# Patient Record
Sex: MALE | Race: WHITE | NOT HISPANIC OR LATINO | Employment: FULL TIME | URBAN - METROPOLITAN AREA
[De-identification: names, ages, dates, MRNs, and addresses within clinical notes are randomized per-mention and may not be internally consistent; named-entity substitution may affect disease eponyms.]

---

## 2020-03-11 ENCOUNTER — TELEPHONE (OUTPATIENT)
Dept: ENDOSCOPY | Facility: HOSPITAL | Age: 50
End: 2020-03-11

## 2020-03-12 ENCOUNTER — OFFICE VISIT (OUTPATIENT)
Dept: GASTROENTEROLOGY | Facility: CLINIC | Age: 50
DRG: 378 | End: 2020-03-12
Payer: COMMERCIAL

## 2020-03-12 VITALS
WEIGHT: 255.75 LBS | DIASTOLIC BLOOD PRESSURE: 86 MMHG | HEIGHT: 66 IN | HEART RATE: 74 BPM | BODY MASS INDEX: 41.1 KG/M2 | SYSTOLIC BLOOD PRESSURE: 124 MMHG

## 2020-03-12 DIAGNOSIS — E78.5 HYPERLIPIDEMIA, UNSPECIFIED HYPERLIPIDEMIA TYPE: ICD-10-CM

## 2020-03-12 DIAGNOSIS — I10 HYPERTENSION, UNSPECIFIED TYPE: ICD-10-CM

## 2020-03-12 DIAGNOSIS — Z12.11 COLON CANCER SCREENING: Primary | ICD-10-CM

## 2020-03-12 PROCEDURE — 99999 PR PBB SHADOW E&M-EST. PATIENT-LVL III: ICD-10-PCS | Mod: PBBFAC,,, | Performed by: PHYSICIAN ASSISTANT

## 2020-03-12 PROCEDURE — 99999 PR PBB SHADOW E&M-EST. PATIENT-LVL III: CPT | Mod: PBBFAC,,, | Performed by: PHYSICIAN ASSISTANT

## 2020-03-12 PROCEDURE — 99499 UNLISTED E&M SERVICE: CPT | Mod: S$PBB,,, | Performed by: PHYSICIAN ASSISTANT

## 2020-03-12 PROCEDURE — 99213 OFFICE O/P EST LOW 20 MIN: CPT | Mod: PBBFAC | Performed by: PHYSICIAN ASSISTANT

## 2020-03-12 PROCEDURE — 99499 NO LOS: ICD-10-PCS | Mod: S$PBB,,, | Performed by: PHYSICIAN ASSISTANT

## 2020-03-12 RX ORDER — PREDNISONE 20 MG/1
TABLET ORAL
COMMUNITY
Start: 2020-01-20 | End: 2020-03-14

## 2020-03-12 RX ORDER — POLYETHYLENE GLYCOL 3350, SODIUM CHLORIDE, SODIUM BICARBONATE, POTASSIUM CHLORIDE 420; 11.2; 5.72; 1.48 G/4L; G/4L; G/4L; G/4L
1 POWDER, FOR SOLUTION ORAL ONCE
Qty: 4000 ML | Refills: 0 | OUTPATIENT
Start: 2020-03-12 | End: 2020-03-15 | Stop reason: HOSPADM

## 2020-03-12 RX ORDER — AMLODIPINE BESYLATE 5 MG/1
TABLET ORAL
COMMUNITY
End: 2020-03-14

## 2020-03-12 NOTE — PROGRESS NOTES
"1. Have you been admitted overnight to the hospital in the past 3 months? no   If yes, schedule an appointment with PCP before scheduling endoscopic procedure.     2. Have you had a stent placed in the last 12 months? no   If yes, for a screening visit, cancel and message the ordering provider.  The patient will need a new order when the time is appropriate.     3. Have you had a stroke or heart attack in the past 6 months? no   If yes, cancel and refer patient to ordering provider for clearance, also message ordering provider to inform.     4. Have you had any chest pain in the past 3 months? no   If yes, Have you been evaluated by your PCP and/or cardiologist and it was determined to not be heart related? not applicable   If No, Pt needs to be seen by PCP or Cardiologist .  Pt can be scheduled once clearance obtained by either of those providers.     5. Do you take prescription weight loss medications?  no   If yes, must stop for 2 weeks prior to procedure.     6. Have you been diagnosed with diverticulitis within the past 3 months? no   If yes, must have been seen by GI within the last 3 months, if not schedule with GI MARTIN.    If pt has been seen by GI, schedule procedure 8-12 weeks post antibiotic treatment.     7. Are you on Dialysis? no  If yes, schedule procedure for the day AFTER dialysis.  Appt time should be 9am or later, patient arrival time is 2 hours prior.  Nulytely or miralax prep for all patients with kidney disease.     8. Are you diabetic?  no   If yes, schedule morning appt. Advise pt to hold all diabetic meds day of procedure.     9. If pt is older than 80 years of age and HAS NOT been seen by GI or PCP within the last 6 months, needs appt with GI MARTIN.   If pt has been seen by the GI provider or PCP within the past 6  months AND meets criteria, schedule procedure AND send message to the endoscopist.     10. Is patient on a "high risk" medication (blood thinner/antiplatelet agent)?  no   If yes, " has cardiac clearance been obtained within the last 60 days? N/A   If no, a new clearance needs to be obtained.       I have reviewed the last colonoscopy for recommendations regarding next procedure bowel prep.  yes  I have reviewed medications and allergies.  yes  I have verified the pharmacy information and appropriate prep sent if needed. yes  Prep instructions have been mailed or sent to portal per patient request. yes    If answers yes to any of the following, schedule at [facility] ONLY. If No, OK for either location.     Is BMI over 45? no  Any complaints of chest pain, new onset or at rest? no  Does pt have an AICD? no  Is there a diagnosis of heart failure? no  Is patient on dialysis? no  Does patient have an insulin pump? no  If procedure for esophageal banding? no

## 2020-03-12 NOTE — PROGRESS NOTES
Clinic Consult:  [facility] Consultation Note    Reason for Consult:  The encounter diagnosis was Colon cancer screening.    PCP: Primary Doctor No   No address on file    CC: Colonoscopy      HPI:  This is a 49 y.o. male here for evaluation of the above. [facility]. He denies any complaints including abdominal pain, changes in bowel habits, nausea, vomiting, hematochezia, melena. Denies any family history of CRC. Referral in media, although no records.      Review of Systems   Constitutional: Negative for appetite change, chills, diaphoresis, fatigue and fever.   HENT: Negative for trouble swallowing.    Eyes: Negative for photophobia and visual disturbance.   Respiratory: Negative for cough, choking, chest tightness and shortness of breath.    Cardiovascular: Negative for chest pain.   Gastrointestinal: Negative for abdominal distention, abdominal pain, anal bleeding, blood in stool, constipation, diarrhea, nausea and vomiting.   Genitourinary: Negative for dysuria and hematuria.   Musculoskeletal: Negative for back pain.   Neurological: Negative for dizziness, weakness, light-headedness and headaches.   Psychiatric/Behavioral: Negative for agitation, confusion and dysphoric mood. The patient is not nervous/anxious.         Medical History:   No past medical history on file.    Surgical History:   No past surgical history on file.    Family History:    No family history on file.    Social History:   Social History     Socioeconomic History    Marital status:      Spouse name: Not on file    Number of children: Not on file    Years of education: Not on file    Highest education level: Not on file   Occupational History    Not on file   Social Needs    Financial resource strain: Not on file    Food insecurity:     Worry: Not on file     Inability: Not on file    Transportation needs:     Medical: Not on file     Non-medical: Not on file   Tobacco  "Use    Smoking status: Never Smoker    Smokeless tobacco: Never Used   Substance and Sexual Activity    Alcohol use: Yes    Drug use: Never    Sexual activity: Not Currently     Partners: Female   Lifestyle    Physical activity:     Days per week: Not on file     Minutes per session: Not on file    Stress: Not on file   Relationships    Social connections:     Talks on phone: Not on file     Gets together: Not on file     Attends Buddhist service: Not on file     Active member of club or organization: Not on file     Attends meetings of clubs or organizations: Not on file     Relationship status: Not on file   Other Topics Concern    Not on file   Social History Narrative    Not on file       Allergies:   Review of patient's allergies indicates:  No Known Allergies    Home Medications:   Current Outpatient Medications on File Prior to Visit   Medication Sig Dispense Refill    amLODIPine (NORVASC) 5 MG tablet       atorvastatin calcium (LIPITOR ORAL)       citalopram hydrobromide (CITALOPRAM ORAL)       predniSONE (DELTASONE) 20 MG tablet TK 2 TS PO D FOR 3 DAYS. BEGIN TOMORROW       No current facility-administered medications on file prior to visit.        Physical Exam:  /86   Pulse 74   Ht 5' 6" (1.676 m)   Wt 116 kg (255 lb 11.7 oz)   BMI 41.28 kg/m²   Body mass index is 41.28 kg/m².  Physical Exam   Constitutional: He is oriented to person, place, and time. He appears well-developed and well-nourished. No distress.   HENT:   Head: Normocephalic and atraumatic.   Eyes: Pupils are equal, round, and reactive to light. EOM are normal.   Neck: Normal range of motion.   Cardiovascular: Normal rate, regular rhythm and normal heart sounds.   No murmur heard.  Pulmonary/Chest: Effort normal and breath sounds normal. No stridor. No respiratory distress. He has no wheezes.   Abdominal: Soft. Bowel sounds are normal. He exhibits no distension and no mass. There is no tenderness. There is no rebound " and no guarding.   Musculoskeletal: Normal range of motion. He exhibits no deformity.   Neurological: He is alert and oriented to person, place, and time. No cranial nerve deficit.   Skin: Skin is warm and dry. No rash noted. He is not diaphoretic. No erythema. No pallor.   Psychiatric: He has a normal mood and affect. His behavior is normal. Judgment and thought content normal.       Labs: Pertinent labs reviewed.  Endoscopy: none  CRC Screening: none  Anticoagulation: none    Assessment:  1. Colon cancer screening         Recommendations:  -Contact [place] for records  -Schedule colonoscopy with Nulytely.    Colon cancer screening  -     Case request GI: COLONOSCOPY  -     peg-electrolyte soln (NULYTELY WITH FLAVOR PACKS) 420 gram SolR; Take 4,000 mLs by mouth once. for 1 dose  Dispense: 4000 mL; Refill: 0        No follow-ups on file.    Thank you so much for allowing me to participate in the care of Jleani Quezada PA-C

## 2020-03-13 ENCOUNTER — HOSPITAL ENCOUNTER (INPATIENT)
Facility: HOSPITAL | Age: 50
LOS: 1 days | Discharge: HOME | DRG: 378 | End: 2020-03-15
Attending: FAMILY MEDICINE | Admitting: INTERNAL MEDICINE
Payer: COMMERCIAL

## 2020-03-13 DIAGNOSIS — R53.1 WEAKNESS: ICD-10-CM

## 2020-03-13 DIAGNOSIS — K92.2 UPPER GI BLEED: Primary | ICD-10-CM

## 2020-03-13 PROCEDURE — 80053 COMPREHEN METABOLIC PANEL: CPT

## 2020-03-13 PROCEDURE — 86920 COMPATIBILITY TEST SPIN: CPT

## 2020-03-13 PROCEDURE — 99291 CRITICAL CARE FIRST HOUR: CPT | Mod: 25

## 2020-03-13 PROCEDURE — 86703 HIV-1/HIV-2 1 RESULT ANTBDY: CPT

## 2020-03-13 PROCEDURE — 85025 COMPLETE CBC W/AUTO DIFF WBC: CPT

## 2020-03-13 PROCEDURE — 86850 RBC ANTIBODY SCREEN: CPT

## 2020-03-13 PROCEDURE — 85610 PROTHROMBIN TIME: CPT

## 2020-03-13 RX ADMIN — SODIUM CHLORIDE 1000 ML: 0.9 INJECTION, SOLUTION INTRAVENOUS at 11:03

## 2020-03-14 ENCOUNTER — ANESTHESIA (OUTPATIENT)
Dept: ENDOSCOPY | Facility: HOSPITAL | Age: 50
DRG: 378 | End: 2020-03-14
Payer: OTHER GOVERNMENT

## 2020-03-14 ENCOUNTER — ANESTHESIA EVENT (OUTPATIENT)
Dept: ENDOSCOPY | Facility: HOSPITAL | Age: 50
DRG: 378 | End: 2020-03-14
Payer: OTHER GOVERNMENT

## 2020-03-14 PROBLEM — K92.2 UPPER GI BLEED: Status: ACTIVE | Noted: 2020-03-14

## 2020-03-14 PROBLEM — D64.9 SYMPTOMATIC ANEMIA: Status: ACTIVE | Noted: 2020-03-14

## 2020-03-14 LAB
ABO + RH BLD: NORMAL
ALBUMIN SERPL BCP-MCNC: 3.2 G/DL (ref 3.5–5.2)
ALP SERPL-CCNC: 45 U/L (ref 55–135)
ALT SERPL W/O P-5'-P-CCNC: 15 U/L (ref 10–44)
ANION GAP SERPL CALC-SCNC: 10 MMOL/L (ref 8–16)
AST SERPL-CCNC: 8 U/L (ref 10–40)
BASOPHILS # BLD AUTO: 0.01 K/UL (ref 0–0.2)
BASOPHILS # BLD AUTO: 0.01 K/UL (ref 0–0.2)
BASOPHILS # BLD AUTO: 0.02 K/UL (ref 0–0.2)
BASOPHILS # BLD AUTO: 0.05 K/UL (ref 0–0.2)
BASOPHILS NFR BLD: 0.1 % (ref 0–1.9)
BASOPHILS NFR BLD: 0.1 % (ref 0–1.9)
BASOPHILS NFR BLD: 0.2 % (ref 0–1.9)
BASOPHILS NFR BLD: 0.3 % (ref 0–1.9)
BILIRUB SERPL-MCNC: 0.2 MG/DL (ref 0.1–1)
BILIRUB UR QL STRIP: NEGATIVE
BLD GP AB SCN CELLS X3 SERPL QL: NORMAL
BLD PROD TYP BPU: NORMAL
BLD PROD TYP BPU: NORMAL
BLOOD UNIT EXPIRATION DATE: NORMAL
BLOOD UNIT EXPIRATION DATE: NORMAL
BLOOD UNIT TYPE CODE: 6200
BLOOD UNIT TYPE CODE: 6200
BLOOD UNIT TYPE: NORMAL
BLOOD UNIT TYPE: NORMAL
BUN SERPL-MCNC: 49 MG/DL (ref 6–20)
CALCIUM SERPL-MCNC: 8.7 MG/DL (ref 8.7–10.5)
CHLORIDE SERPL-SCNC: 108 MMOL/L (ref 95–110)
CLARITY UR: CLEAR
CO2 SERPL-SCNC: 23 MMOL/L (ref 23–29)
CODING SYSTEM: NORMAL
CODING SYSTEM: NORMAL
COLOR UR: YELLOW
CREAT SERPL-MCNC: 1 MG/DL (ref 0.5–1.4)
DIFFERENTIAL METHOD: ABNORMAL
DISPENSE STATUS: NORMAL
DISPENSE STATUS: NORMAL
EOSINOPHIL # BLD AUTO: 0 K/UL (ref 0–0.5)
EOSINOPHIL NFR BLD: 0 % (ref 0–8)
EOSINOPHIL NFR BLD: 0 % (ref 0–8)
EOSINOPHIL NFR BLD: 0.2 % (ref 0–8)
EOSINOPHIL NFR BLD: 0.4 % (ref 0–8)
ERYTHROCYTE [DISTWIDTH] IN BLOOD BY AUTOMATED COUNT: 14 % (ref 11.5–14.5)
ERYTHROCYTE [DISTWIDTH] IN BLOOD BY AUTOMATED COUNT: 15.1 % (ref 11.5–14.5)
ERYTHROCYTE [DISTWIDTH] IN BLOOD BY AUTOMATED COUNT: 15.2 % (ref 11.5–14.5)
ERYTHROCYTE [DISTWIDTH] IN BLOOD BY AUTOMATED COUNT: 15.4 % (ref 11.5–14.5)
EST. GFR  (AFRICAN AMERICAN): >60 ML/MIN/1.73 M^2
EST. GFR  (NON AFRICAN AMERICAN): >60 ML/MIN/1.73 M^2
GLUCOSE SERPL-MCNC: 195 MG/DL (ref 70–110)
GLUCOSE UR QL STRIP: NEGATIVE
HCT VFR BLD AUTO: 24.8 % (ref 40–54)
HCT VFR BLD AUTO: 24.8 % (ref 40–54)
HCT VFR BLD AUTO: 25.4 % (ref 40–54)
HCT VFR BLD AUTO: 26.7 % (ref 40–54)
HGB BLD-MCNC: 7.8 G/DL (ref 14–18)
HGB BLD-MCNC: 8.1 G/DL (ref 14–18)
HGB BLD-MCNC: 8.4 G/DL (ref 14–18)
HGB BLD-MCNC: 8.6 G/DL (ref 14–18)
HGB UR QL STRIP: NEGATIVE
HIV 1+2 AB+HIV1 P24 AG SERPL QL IA: NEGATIVE
IMM GRANULOCYTES # BLD AUTO: 0.05 K/UL (ref 0–0.04)
IMM GRANULOCYTES # BLD AUTO: 0.08 K/UL (ref 0–0.04)
IMM GRANULOCYTES # BLD AUTO: 0.08 K/UL (ref 0–0.04)
IMM GRANULOCYTES # BLD AUTO: 0.2 K/UL (ref 0–0.04)
IMM GRANULOCYTES NFR BLD AUTO: 0.6 % (ref 0–0.5)
IMM GRANULOCYTES NFR BLD AUTO: 0.7 % (ref 0–0.5)
IMM GRANULOCYTES NFR BLD AUTO: 0.8 % (ref 0–0.5)
IMM GRANULOCYTES NFR BLD AUTO: 1.2 % (ref 0–0.5)
INR PPP: 1 (ref 0.8–1.2)
KETONES UR QL STRIP: NEGATIVE
LEUKOCYTE ESTERASE UR QL STRIP: NEGATIVE
LYMPHOCYTES # BLD AUTO: 0.7 K/UL (ref 1–4.8)
LYMPHOCYTES # BLD AUTO: 0.8 K/UL (ref 1–4.8)
LYMPHOCYTES # BLD AUTO: 1 K/UL (ref 1–4.8)
LYMPHOCYTES # BLD AUTO: 4.8 K/UL (ref 1–4.8)
LYMPHOCYTES NFR BLD: 11.4 % (ref 18–48)
LYMPHOCYTES NFR BLD: 29.2 % (ref 18–48)
LYMPHOCYTES NFR BLD: 6.7 % (ref 18–48)
LYMPHOCYTES NFR BLD: 6.9 % (ref 18–48)
MCH RBC QN AUTO: 30.9 PG (ref 27–31)
MCH RBC QN AUTO: 31.3 PG (ref 27–31)
MCH RBC QN AUTO: 31.3 PG (ref 27–31)
MCH RBC QN AUTO: 31.9 PG (ref 27–31)
MCHC RBC AUTO-ENTMCNC: 31.5 G/DL (ref 32–36)
MCHC RBC AUTO-ENTMCNC: 32.2 G/DL (ref 32–36)
MCHC RBC AUTO-ENTMCNC: 32.7 G/DL (ref 32–36)
MCHC RBC AUTO-ENTMCNC: 33.1 G/DL (ref 32–36)
MCV RBC AUTO: 100 FL (ref 82–98)
MCV RBC AUTO: 95 FL (ref 82–98)
MCV RBC AUTO: 96 FL (ref 82–98)
MCV RBC AUTO: 98 FL (ref 82–98)
MONOCYTES # BLD AUTO: 0.3 K/UL (ref 0.3–1)
MONOCYTES # BLD AUTO: 0.4 K/UL (ref 0.3–1)
MONOCYTES # BLD AUTO: 0.6 K/UL (ref 0.3–1)
MONOCYTES # BLD AUTO: 1 K/UL (ref 0.3–1)
MONOCYTES NFR BLD: 2.5 % (ref 4–15)
MONOCYTES NFR BLD: 3.3 % (ref 4–15)
MONOCYTES NFR BLD: 6 % (ref 4–15)
MONOCYTES NFR BLD: 6.9 % (ref 4–15)
NEUTROPHILS # BLD AUTO: 10 K/UL (ref 1.8–7.7)
NEUTROPHILS # BLD AUTO: 10.4 K/UL (ref 1.8–7.7)
NEUTROPHILS # BLD AUTO: 7.3 K/UL (ref 1.8–7.7)
NEUTROPHILS # BLD AUTO: 9.2 K/UL (ref 1.8–7.7)
NEUTROPHILS NFR BLD: 63.1 % (ref 38–73)
NEUTROPHILS NFR BLD: 80.5 % (ref 38–73)
NEUTROPHILS NFR BLD: 89 % (ref 38–73)
NEUTROPHILS NFR BLD: 89.9 % (ref 38–73)
NITRITE UR QL STRIP: NEGATIVE
NRBC BLD-RTO: 0 /100 WBC
NUM UNITS TRANS PACKED RBC: NORMAL
NUM UNITS TRANS PACKED RBC: NORMAL
OB PNL STL: POSITIVE
PH UR STRIP: 6 [PH] (ref 5–8)
PLATELET # BLD AUTO: 141 K/UL (ref 150–350)
PLATELET # BLD AUTO: 149 K/UL (ref 150–350)
PLATELET # BLD AUTO: 150 K/UL (ref 150–350)
PLATELET # BLD AUTO: 227 K/UL (ref 150–350)
PMV BLD AUTO: 10.4 FL (ref 9.2–12.9)
PMV BLD AUTO: 10.9 FL (ref 9.2–12.9)
PMV BLD AUTO: 11.1 FL (ref 9.2–12.9)
PMV BLD AUTO: 11.2 FL (ref 9.2–12.9)
POTASSIUM SERPL-SCNC: 4 MMOL/L (ref 3.5–5.1)
PROT SERPL-MCNC: 5.5 G/DL (ref 6–8.4)
PROT UR QL STRIP: NEGATIVE
PROTHROMBIN TIME: 10.4 SEC (ref 9–12.5)
RBC # BLD AUTO: 2.49 M/UL (ref 4.6–6.2)
RBC # BLD AUTO: 2.54 M/UL (ref 4.6–6.2)
RBC # BLD AUTO: 2.68 M/UL (ref 4.6–6.2)
RBC # BLD AUTO: 2.78 M/UL (ref 4.6–6.2)
SODIUM SERPL-SCNC: 141 MMOL/L (ref 136–145)
SP GR UR STRIP: 1.02 (ref 1–1.03)
URN SPEC COLLECT METH UR: NORMAL
UROBILINOGEN UR STRIP-ACNC: NEGATIVE EU/DL
WBC # BLD AUTO: 10.17 K/UL (ref 3.9–12.7)
WBC # BLD AUTO: 11.2 K/UL (ref 3.9–12.7)
WBC # BLD AUTO: 16.49 K/UL (ref 3.9–12.7)
WBC # BLD AUTO: 9.04 K/UL (ref 3.9–12.7)

## 2020-03-14 PROCEDURE — 93005 ELECTROCARDIOGRAM TRACING: CPT

## 2020-03-14 PROCEDURE — 25000003 PHARM REV CODE 250: Performed by: NURSE ANESTHETIST, CERTIFIED REGISTERED

## 2020-03-14 PROCEDURE — 85025 COMPLETE CBC W/AUTO DIFF WBC: CPT | Mod: 91

## 2020-03-14 PROCEDURE — 21400001 HC TELEMETRY ROOM

## 2020-03-14 PROCEDURE — 99223 1ST HOSP IP/OBS HIGH 75: CPT | Mod: 25,,, | Performed by: INTERNAL MEDICINE

## 2020-03-14 PROCEDURE — 43235 EGD DIAGNOSTIC BRUSH WASH: CPT | Mod: ,,, | Performed by: INTERNAL MEDICINE

## 2020-03-14 PROCEDURE — 63600175 PHARM REV CODE 636 W HCPCS: Performed by: NURSE ANESTHETIST, CERTIFIED REGISTERED

## 2020-03-14 PROCEDURE — 43235 PR EGD, FLEX, DIAGNOSTIC: ICD-10-PCS | Mod: ,,, | Performed by: INTERNAL MEDICINE

## 2020-03-14 PROCEDURE — C9113 INJ PANTOPRAZOLE SODIUM, VIA: HCPCS | Performed by: PHYSICIAN ASSISTANT

## 2020-03-14 PROCEDURE — 63600175 PHARM REV CODE 636 W HCPCS: Performed by: FAMILY MEDICINE

## 2020-03-14 PROCEDURE — C9113 INJ PANTOPRAZOLE SODIUM, VIA: HCPCS | Performed by: INTERNAL MEDICINE

## 2020-03-14 PROCEDURE — 25500020 PHARM REV CODE 255: Performed by: EMERGENCY MEDICINE

## 2020-03-14 PROCEDURE — 43235 EGD DIAGNOSTIC BRUSH WASH: CPT | Performed by: INTERNAL MEDICINE

## 2020-03-14 PROCEDURE — 37000008 HC ANESTHESIA 1ST 15 MINUTES: Performed by: INTERNAL MEDICINE

## 2020-03-14 PROCEDURE — 81003 URINALYSIS AUTO W/O SCOPE: CPT

## 2020-03-14 PROCEDURE — 96374 THER/PROPH/DIAG INJ IV PUSH: CPT

## 2020-03-14 PROCEDURE — 96361 HYDRATE IV INFUSION ADD-ON: CPT

## 2020-03-14 PROCEDURE — C9113 INJ PANTOPRAZOLE SODIUM, VIA: HCPCS | Performed by: FAMILY MEDICINE

## 2020-03-14 PROCEDURE — 36430 TRANSFUSION BLD/BLD COMPNT: CPT

## 2020-03-14 PROCEDURE — 63600175 PHARM REV CODE 636 W HCPCS: Performed by: PHYSICIAN ASSISTANT

## 2020-03-14 PROCEDURE — 25000003 PHARM REV CODE 250: Performed by: INTERNAL MEDICINE

## 2020-03-14 PROCEDURE — 82272 OCCULT BLD FECES 1-3 TESTS: CPT

## 2020-03-14 PROCEDURE — 93010 EKG 12-LEAD: ICD-10-PCS | Mod: ,,, | Performed by: INTERNAL MEDICINE

## 2020-03-14 PROCEDURE — 96376 TX/PRO/DX INJ SAME DRUG ADON: CPT

## 2020-03-14 PROCEDURE — 99223 PR INITIAL HOSPITAL CARE,LEVL III: ICD-10-PCS | Mod: 25,,, | Performed by: INTERNAL MEDICINE

## 2020-03-14 PROCEDURE — 87338 HPYLORI STOOL AG IA: CPT

## 2020-03-14 PROCEDURE — P9016 RBC LEUKOCYTES REDUCED: HCPCS

## 2020-03-14 PROCEDURE — 36415 COLL VENOUS BLD VENIPUNCTURE: CPT

## 2020-03-14 PROCEDURE — 63600175 PHARM REV CODE 636 W HCPCS: Performed by: INTERNAL MEDICINE

## 2020-03-14 PROCEDURE — 93010 ELECTROCARDIOGRAM REPORT: CPT | Mod: ,,, | Performed by: INTERNAL MEDICINE

## 2020-03-14 RX ORDER — ONDANSETRON 8 MG/1
8 TABLET, ORALLY DISINTEGRATING ORAL EVERY 8 HOURS PRN
Status: DISCONTINUED | OUTPATIENT
Start: 2020-03-14 | End: 2020-03-15 | Stop reason: HOSPADM

## 2020-03-14 RX ORDER — HYDROCODONE BITARTRATE AND ACETAMINOPHEN 500; 5 MG/1; MG/1
TABLET ORAL
Status: DISCONTINUED | OUTPATIENT
Start: 2020-03-14 | End: 2020-03-15 | Stop reason: HOSPADM

## 2020-03-14 RX ORDER — SODIUM CHLORIDE 9 MG/ML
INJECTION, SOLUTION INTRAVENOUS CONTINUOUS
Status: DISCONTINUED | OUTPATIENT
Start: 2020-03-14 | End: 2020-03-14

## 2020-03-14 RX ORDER — PANTOPRAZOLE SODIUM 40 MG/10ML
40 INJECTION, POWDER, LYOPHILIZED, FOR SOLUTION INTRAVENOUS
Status: COMPLETED | OUTPATIENT
Start: 2020-03-14 | End: 2020-03-14

## 2020-03-14 RX ORDER — AMLODIPINE BESYLATE 5 MG/1
5 TABLET ORAL DAILY
Status: ON HOLD | COMMUNITY
End: 2020-03-15 | Stop reason: SDUPTHER

## 2020-03-14 RX ORDER — ACETAMINOPHEN 325 MG/1
650 TABLET ORAL EVERY 6 HOURS PRN
Status: DISCONTINUED | OUTPATIENT
Start: 2020-03-14 | End: 2020-03-15 | Stop reason: HOSPADM

## 2020-03-14 RX ORDER — LIDOCAINE HYDROCHLORIDE 10 MG/ML
INJECTION, SOLUTION EPIDURAL; INFILTRATION; INTRACAUDAL; PERINEURAL
Status: DISCONTINUED | OUTPATIENT
Start: 2020-03-14 | End: 2020-03-14

## 2020-03-14 RX ORDER — FLUTICASONE PROPIONATE 50 MCG
1 SPRAY, SUSPENSION (ML) NASAL DAILY
COMMUNITY

## 2020-03-14 RX ORDER — SODIUM CHLORIDE 9 MG/ML
INJECTION, SOLUTION INTRAVENOUS CONTINUOUS
Status: ACTIVE | OUTPATIENT
Start: 2020-03-14 | End: 2020-03-15

## 2020-03-14 RX ORDER — PANTOPRAZOLE SODIUM 40 MG/1
40 TABLET, DELAYED RELEASE ORAL 2 TIMES DAILY
Status: DISCONTINUED | OUTPATIENT
Start: 2020-03-14 | End: 2020-03-15 | Stop reason: HOSPADM

## 2020-03-14 RX ORDER — DIPHENHYDRAMINE HCL 25 MG
25 TABLET ORAL NIGHTLY
Status: ON HOLD | COMMUNITY
End: 2020-03-15 | Stop reason: HOSPADM

## 2020-03-14 RX ORDER — CETIRIZINE HYDROCHLORIDE 10 MG/1
10 TABLET ORAL DAILY
COMMUNITY

## 2020-03-14 RX ORDER — HYDROCODONE BITARTRATE AND ACETAMINOPHEN 5; 325 MG/1; MG/1
1 TABLET ORAL EVERY 6 HOURS PRN
Status: DISCONTINUED | OUTPATIENT
Start: 2020-03-14 | End: 2020-03-15 | Stop reason: HOSPADM

## 2020-03-14 RX ORDER — PROPOFOL 10 MG/ML
VIAL (ML) INTRAVENOUS
Status: DISCONTINUED | OUTPATIENT
Start: 2020-03-14 | End: 2020-03-14

## 2020-03-14 RX ORDER — PANTOPRAZOLE SODIUM 40 MG/10ML
40 INJECTION, POWDER, LYOPHILIZED, FOR SOLUTION INTRAVENOUS 2 TIMES DAILY
Status: DISCONTINUED | OUTPATIENT
Start: 2020-03-14 | End: 2020-03-14

## 2020-03-14 RX ORDER — LABETALOL HYDROCHLORIDE 5 MG/ML
10 INJECTION, SOLUTION INTRAVENOUS EVERY 6 HOURS PRN
Status: DISCONTINUED | OUTPATIENT
Start: 2020-03-14 | End: 2020-03-15 | Stop reason: HOSPADM

## 2020-03-14 RX ORDER — SODIUM CHLORIDE, SODIUM LACTATE, POTASSIUM CHLORIDE, CALCIUM CHLORIDE 600; 310; 30; 20 MG/100ML; MG/100ML; MG/100ML; MG/100ML
INJECTION, SOLUTION INTRAVENOUS CONTINUOUS PRN
Status: DISCONTINUED | OUTPATIENT
Start: 2020-03-14 | End: 2020-03-14

## 2020-03-14 RX ADMIN — IOHEXOL 100 ML: 350 INJECTION, SOLUTION INTRAVENOUS at 02:03

## 2020-03-14 RX ADMIN — PROPOFOL 150 MG: 10 INJECTION, EMULSION INTRAVENOUS at 02:03

## 2020-03-14 RX ADMIN — SODIUM CHLORIDE 1000 ML: 0.9 INJECTION, SOLUTION INTRAVENOUS at 06:03

## 2020-03-14 RX ADMIN — PROPOFOL 70 MG: 10 INJECTION, EMULSION INTRAVENOUS at 02:03

## 2020-03-14 RX ADMIN — PANTOPRAZOLE SODIUM 40 MG: 40 TABLET, DELAYED RELEASE ORAL at 09:03

## 2020-03-14 RX ADMIN — SODIUM CHLORIDE: 0.9 INJECTION, SOLUTION INTRAVENOUS at 03:03

## 2020-03-14 RX ADMIN — PANTOPRAZOLE SODIUM 8 MG/HR: 40 INJECTION, POWDER, FOR SOLUTION INTRAVENOUS at 01:03

## 2020-03-14 RX ADMIN — SODIUM CHLORIDE: 0.9 INJECTION, SOLUTION INTRAVENOUS at 08:03

## 2020-03-14 RX ADMIN — PANTOPRAZOLE SODIUM 40 MG: 40 INJECTION, POWDER, LYOPHILIZED, FOR SOLUTION INTRAVENOUS at 12:03

## 2020-03-14 RX ADMIN — SODIUM CHLORIDE, SODIUM LACTATE, POTASSIUM CHLORIDE, AND CALCIUM CHLORIDE: 600; 310; 30; 20 INJECTION, SOLUTION INTRAVENOUS at 02:03

## 2020-03-14 RX ADMIN — PANTOPRAZOLE SODIUM 40 MG: 40 INJECTION, POWDER, LYOPHILIZED, FOR SOLUTION INTRAVENOUS at 05:03

## 2020-03-14 RX ADMIN — LIDOCAINE HYDROCHLORIDE 10 ML: 10 INJECTION, SOLUTION EPIDURAL; INFILTRATION; INTRACAUDAL; PERINEURAL at 02:03

## 2020-03-14 NOTE — H&P
[facility] Hospital Medicine  History & Physical    Patient Name: Jelani Whaley  MRN: [id number]  Admission Date: 3/13/2020  Attending Physician: Shekhar Davies MD   Primary Care Provider: Primary Doctor No         Patient information was obtained from patient, past medical records and ER records.     Subjective:     Principal Problem:Upper GI bleed    Chief Complaint:   Chief Complaint   Patient presents with    Melena     S/O 24hr ago; Pt. states that stool is tarry/dark black. Estimates 5-6 BMs last night and 1 BM today with blood. Pt. pale.    Weakness    Loss of Consciousness     Pt. reports multiple syncopal episodes between last night and today with standing/walking. Denies hitting head in syncopal episodes.        HPI: Jelani Whaley is a 49 year old male with hypertension and hyperlipidemia who presented to the ED with reports of melena that began one day prior to presentation. The patient reports that he awoke during the night prior to presentation, had multiple black stools, which were initially formed, but became tarry. There was associated dizziness and possible syncope relieved by laying on the ground. The patient also notes associated palpitations and mild nausea two days prior to presentation. He denies abdominal pain, vomiting, bright red blood per rectum and prior history of GI bleeding. He reports taking 2-3 packets of BC powders daily and was recently prescribed steroids for an upper respiratory illness. In the ED, the patient was tachycardic and hypotensive. His hemoglobin was 8.1 with no prior measurement available. Other labs were significant for a BUN of 49 with a creatinine of 1, glucose of 195, albumin of 3.2 and total protein of 5.5. CTA of the abdomen is pending.     Past Medical History:   Diagnosis Date    Hyperlipidemia     Hypertension        History reviewed. No pertinent surgical history.    Review of patient's allergies indicates:  No Known Allergies    No  current facility-administered medications on file prior to encounter.      Current Outpatient Medications on File Prior to Encounter   Medication Sig    amLODIPine (NORVASC) 5 MG tablet Take 5 mg by mouth once daily.    aspirin/salicylamide/caffeine (BC HEADACHE POWDER ORAL) Take 1 packet by mouth 3 (three) times daily as needed (Patient taking 2-3 daily).    atorvastatin calcium (LIPITOR ORAL) Take by mouth.    cetirizine (ZYRTEC) 10 MG tablet Take 10 mg by mouth once daily.    diphenhydrAMINE (SOMINEX) 25 mg tablet Take 25 mg by mouth every evening.    fluticasone propionate (FLONASE) 50 mcg/actuation nasal spray 1 spray by Each Nostril route once daily.    [DISCONTINUED] amLODIPine (NORVASC) 5 MG tablet     [DISCONTINUED] atorvastatin calcium (LIPITOR ORAL)     [DISCONTINUED] citalopram hydrobromide (CITALOPRAM ORAL)     [DISCONTINUED] predniSONE (DELTASONE) 20 MG tablet TK 2 TS PO D FOR 3 DAYS. BEGIN TOMORROW     Family History     Reviewed and not pertinent.         Tobacco Use    Smoking status: Never Smoker    Smokeless tobacco: Never Used   Substance and Sexual Activity    Alcohol use: Yes     Frequency: 2-4 times a month     Drinks per session: 3 or 4    Drug use: Never    Sexual activity: Not Currently     Partners: Female     Review of Systems   Constitutional: Negative for appetite change, chills, diaphoresis, fatigue and fever.   HENT: Negative for congestion, ear pain, mouth sores, sore throat and trouble swallowing.    Eyes: Negative for pain and visual disturbance.   Respiratory: Negative for cough, chest tightness and shortness of breath.    Cardiovascular: Positive for palpitations. Negative for chest pain and leg swelling.   Gastrointestinal: Positive for blood in stool (melena). Negative for abdominal pain, constipation, diarrhea and nausea.   Endocrine: Negative for cold intolerance, heat intolerance, polydipsia and polyuria.   Genitourinary: Negative for dysuria, frequency and  hematuria.   Musculoskeletal: Negative for arthralgias, back pain, myalgias and neck pain.   Skin: Negative for pallor, rash and wound.   Allergic/Immunologic: Negative for environmental allergies and immunocompromised state.   Neurological: Positive for dizziness and syncope. Negative for seizures, weakness, numbness and headaches.   Hematological: Negative for adenopathy. Does not bruise/bleed easily.   Psychiatric/Behavioral: Negative for agitation, confusion and sleep disturbance.     Objective:     Vital Signs (Most Recent):  Temp: 98.4 °F (36.9 °C) (03/14/20 0334)  Pulse: 110 (03/14/20 0450)  Resp: 18 (03/14/20 0450)  BP: 125/64 (03/14/20 0450)  SpO2: 100 % (03/14/20 0450) Vital Signs (24h Range):  Temp:  [97.8 °F (36.6 °C)-98.6 °F (37 °C)] 98.4 °F (36.9 °C)  Pulse:  [] 110  Resp:  [15-20] 18  SpO2:  [97 %-100 %] 100 %  BP: ()/(52-70) 125/64     Weight: 113.5 kg (250 lb 3.6 oz)  Body mass index is 40.39 kg/m².    Physical Exam   Constitutional: He is oriented to person, place, and time. He appears well-developed and well-nourished.   HENT:   Head: Normocephalic and atraumatic.   Eyes: Conjunctivae are normal.   Neck: Neck supple. No JVD present.   Cardiovascular: Normal rate, regular rhythm and normal heart sounds.   Pulmonary/Chest: Effort normal and breath sounds normal. He has no wheezes.   Abdominal: Soft. Bowel sounds are normal. He exhibits no distension. There is no tenderness.   Musculoskeletal: Normal range of motion.   Neurological: He is alert and oriented to person, place, and time.   Skin: Skin is warm and dry. No rash noted.   Psychiatric: He has a normal mood and affect. His behavior is normal. Thought content normal.   Nursing note and vitals reviewed.          Significant Labs:   CBC:   Recent Labs   Lab 03/13/20  2355   WBC 16.49*   HGB 8.1*   HCT 24.8*        CMP:   Recent Labs   Lab 03/13/20  2355      K 4.0      CO2 23   *   BUN 49*   CREATININE 1.0    CALCIUM 8.7   PROT 5.5*   ALBUMIN 3.2*   BILITOT 0.2   ALKPHOS 45*   AST 8*   ALT 15   ANIONGAP 10   EGFRNONAA >60     All pertinent labs within the past 24 hours have been reviewed.    Significant Imaging: I have reviewed all pertinent imaging results/findings within the past 24 hours.     Assessment/Plan:     * Upper GI bleed  -Transfuse 2 units PRBC.   -Monitor hemoglobin and hematocrit.   -IV BID Protonix.   -GI consult.   -Patient counseled on cessation of BC powder use.     Symptomatic anemia  -Transfuse 2 units PRBC.   -Monitor hemoglobin and hematocrit.     Hyperlipidemia  Resume statin when appropriate.       Hypertension  -Hold Norvasc due to volume loss and presumed positive orthostatics due to intolerance.   -Labetalol as needed.       VTE Risk Mitigation (From admission, onward)         Ordered     IP VTE HIGH RISK PATIENT  Once      03/14/20 0440     Place sequential compression device  Until discontinued      03/14/20 0440                   LUIS MANUEL Burnett  Department of Hospital Medicine   [facility]

## 2020-03-14 NOTE — PLAN OF CARE
Pt AAO x4.  Pt remains free of falls throughout shift.  Plan of care reviewed. Pt verbalizes understanding.  Pt moving and turning independently. Frequent weight shifting encouraged.  Sinus tach rhythm on monitor.  Hourly rounding completed.  Pt currently getting 1 unit blood. Tolerating well.  EGD scheduled for 2:00 PM today.  Will continue to monitor.

## 2020-03-14 NOTE — PROGRESS NOTES
[facility]   Gastroenterology  Progress Note    Patient Name: Jelani Whaley  MRN: [id number]  Admission Date: 3/13/2020  Hospital Length of Stay: 0 days  Code Status: Full Code   Attending Provider: Merlene Mclean MD  Consulting Provider: Lenka Prather MD  Primary Care Physician: Primary Doctor No  Principal Problem: Upper GI bleed    No new subjective & objective note has been filed under this hospital service since the last note was generated.  HPI:    Mr. Whaley is this is a 49-year-old male admitted with upper gastrointestinal bleeding presenting with melena.  Has been suffering from sinusitis and upper respiratory tract infection and a couple weeks ago was given steroids.  In addition to that he has been taking BC powder and went to the GI clinic last week for a colon cancer screening schedule an office appointment.  On Thursday has noticed black tarry stools along with his fatigue and tiredness he has felt lightheaded as well and came to the hospital last night and felt was found to be hypotensive.  Lab testing showed a hemoglobin of 8.1 hematocrit of 24.8 a.m. also had a white count of 16.49 platelet count of 202.  BUN was elevated 49 with a normal creatinine.     Other symptoms include abdominal pain in the epigastric area that is burning and nonradiating, moderate in severity.  Also had palpitations, nausea, shortness of breath.  Possible loss of consciousness as he is unsure and on admission he is not aware that he blacked out but was not completely aware of his surroundings.  He has denied any hematemesis, hematochezia, bleeding elsewhere, use of oral anticoagulants, prior upper endoscopies, colonoscopy, family history of gastrointestinal malignancies.  He has received 1 unit of blood and his repeat CBC showed a hemoglobin of 7.8 and hematocrit of 24.8 with a white count of 10 point 17 and platelet count of 141. No history of liver disease. Currently he is being prepared for a  "2nd unit of blood.  Currently in telemetry bed.    Past medical history is significant for hypertension, dyslipidemia and obesity.    ROS:  CONSTITUTIONAL: Denies weight change,  fatigue, fevers, chills, night sweats.  EYES: No changes in vision.   ENT: No oral lesions or sore throat.  HEMATOLOGICAL/Lymph: Denies bleeding tendency, bruising tendency. No swellings or enlarged lymph nodes.  CARDIOVASCULAR: Denies chest pain  RESPIRATORY: Denies cough, hemoptysis, dyspnea, and wheezing.  GI: See HPI.  : Denies dysuria and hematuria  MUSCULOSKELETAL: Denies joint pain or swelling, back pain and muscle pain.  SKIN: Denies rashes.  NEUROLOGIC: Denies headaches, seizures and numbness.  PSYCHIATRIC: Denies depression or anxiety.  ENDOCRINE: Denies heat or cold intolerance and excessive thirst or urination.    Physical Exam:  Vital Signs:  BP (!) (P) 98/54   Pulse (P) 105   Temp (P) 99.1 °F (37.3 °C) (Oral)   Resp (P) 16   Ht 5' 6" (1.676 m)   Wt 113.5 kg (250 lb 3.6 oz)   SpO2 (P) 97%   BMI 40.39 kg/m²   Body mass index is 40.39 kg/m².      GENERAL: No acute distress, A&Ox3  EYES: Anicteric, pale conjunctivae noted.  ENT: OP clear  NECK: Supple, no masses, no thyromegally.  CHEST: Equal breath sounds bilaterally, no wheezing.  CARDIOVASCULAR: Regular rate and rhythm. Murmurs, rubs and gallops absent.  ABDOMEN: soft, non-tender, non-distended, normal bowel sounds, no hepatosplenomegaly   EXTREMITIES: No clubbing, cyanosis or edema.  SKIN: Without lesion or erythema.  LYMPH: No cervical, axillary lymphadenopathy palpable.   NEUROLOGICAL: Grossly normal, no asterixis present.    Labs: Pertinent labs reviewed.      Assessment/Plan:     No new Assessment & Plan notes have been filed under this hospital service since the last note was generated.  Service: Gastroenterology    Mr. Sultana is a 49-year-old male admitted with melena from most likely upper GI source with a recent history of taking NSAIDs and prednisone at " half with him at risk for peptic ulcer disease.  Currently he is still orthostatic on evaluation today with a hemoglobin and hematocrit after transfusion of 1 unit 7.8 and 24.8 with a prior admission blood count showed a hemoglobin of 8.1 hematocrit of 24.8 mm most likely due to hemoconcentration.  He does not recall when his last episode melena was.  Concern for ongoing bleed is present and will plan for upper endoscopy today after receiving his 2nd unit of blood in the meantime he is on a PPI b.i.d. IV and recommend a PPI drip given concern for ongoing bleed.    CT scan was performed and clear it will .    Recommendations  Continue to keep NPO  Switch to PPI drip with a Protonix 80 mg IV bolus followed by 8 milligrams/hour rate  Supportive hemodynamic IV fluids to maintain blood pressure  EGD after the 2nd unit of blood  Please call me for any questions or concerns    Thank you for your consult. I will follow-up with patient. Please contact us if you have any additional questions.    Lenak Prather MD  Gastroenterology  [facility]

## 2020-03-14 NOTE — PLAN OF CARE
POC reviewed with pt, verbalized understanding. Pt remained free from falls, fall precautions in place. Pt is ST on monitor. VSS. No other c/o at this time. PIV intact, infusing blood. Pt on 1 unit out of 2 units. Pt unable to tolerate sitting on edge of bed to take orthostatics, NP aware. Call bell and personal belongings within reach. Hourly rounding complete. Reminded to call for assistance. Will continue to monitor.

## 2020-03-14 NOTE — ED NOTES
Updated son on plan of care. Son states he will go home and get some rest. Number to call [phone] ( Ziyad Diez).

## 2020-03-14 NOTE — H&P
PRE PROCEDURE H&P    Patient Name: Jelani Whaley  MRN: [id number]  : 1970  Date of Procedure:  3/14/2020  Referring Physician: Self, Aaareferral  Primary Physician: Primary Doctor No  Procedure Physician: Lenka Prather MD       Planned Procedure: EGD  Diagnosis: melena  Chief Complaint: Same as above    HPI: Patient is an 49 y.o. male is here for the above.     Past Medical History:   Past Medical History:   Diagnosis Date    Hyperlipidemia     Hypertension         Past Surgical History:  History reviewed. No pertinent surgical history.     Home Medications:  Prior to Admission medications    Medication Sig Start Date End Date Taking? Authorizing Provider   amLODIPine (NORVASC) 5 MG tablet Take 5 mg by mouth once daily.   Yes Historical Provider, MD   aspirin/salicylamide/caffeine (BC HEADACHE POWDER ORAL) Take 1 packet by mouth 3 (three) times daily as needed (Patient taking 2-3 daily).   Yes Historical Provider, MD   atorvastatin calcium (LIPITOR ORAL) Take by mouth.   Yes Historical Provider, MD   cetirizine (ZYRTEC) 10 MG tablet Take 10 mg by mouth once daily.   Yes Historical Provider, MD   diphenhydrAMINE (SOMINEX) 25 mg tablet Take 25 mg by mouth every evening.   Yes Historical Provider, MD   fluticasone propionate (FLONASE) 50 mcg/actuation nasal spray 1 spray by Each Nostril route once daily.   Yes Historical Provider, MD   amLODIPine (NORVASC) 5 MG tablet   3/14/20  Historical Provider, MD   atorvastatin calcium (LIPITOR ORAL)   3/14/20  Historical Provider, MD   citalopram hydrobromide (CITALOPRAM ORAL)   3/14/20  Historical Provider, MD   predniSONE (DELTASONE) 20 MG tablet TK 2 TS PO D FOR 3 DAYS. BEGIN TOMORROW 1/20/20 3/14/20  Historical Provider, MD        Allergies:  Review of patient's allergies indicates:  No Known Allergies     Social History:   Social History     Socioeconomic History    Marital status:      Spouse name: Not on file    Number of children: Not on  "file    Years of education: Not on file    Highest education level: Not on file   Occupational History    Not on file   Social Needs    Financial resource strain: Not on file    Food insecurity:     Worry: Not on file     Inability: Not on file    Transportation needs:     Medical: Not on file     Non-medical: Not on file   Tobacco Use    Smoking status: Never Smoker    Smokeless tobacco: Never Used   Substance and Sexual Activity    Alcohol use: Yes     Frequency: 2-4 times a month     Drinks per session: 3 or 4    Drug use: Never    Sexual activity: Not Currently     Partners: Female   Lifestyle    Physical activity:     Days per week: Not on file     Minutes per session: Not on file    Stress: Not on file   Relationships    Social connections:     Talks on phone: Not on file     Gets together: Not on file     Attends Restorationist service: Not on file     Active member of club or organization: Not on file     Attends meetings of clubs or organizations: Not on file     Relationship status: Not on file   Other Topics Concern    Not on file   Social History Narrative    Not on file       Family History:  History reviewed. No pertinent family history.    ROS: No acute cardiac events, no acute respiratory complaints.     Physical Exam (all patients):    /66   Pulse 99   Temp 99.5 °F (37.5 °C)   Resp 18   Ht 5' 6" (1.676 m)   Wt 117.5 kg (259 lb 0.7 oz)   SpO2 99%   BMI 41.81 kg/m²   Lungs: Clear to auscultation bilaterally, respirations unlabored  Heart: Regular rate and rhythm, S1 and S2 normal, no obvious murmurs  Abdomen:         Soft, non-tender, bowel sounds normal, no masses, no organomegaly    Lab Results   Component Value Date    WBC 10.17 03/14/2020     (H) 03/14/2020    RDW 15.4 (H) 03/14/2020     (L) 03/14/2020    INR 1.0 03/13/2020     (H) 03/13/2020    BUN 49 (H) 03/13/2020     03/13/2020    K 4.0 03/13/2020     03/13/2020        SEDATION PLAN: per " anesthesia      History reviewed, vital signs satisfactory, cardiopulmonary status satisfactory, sedation options, risks and plans have been discussed with the patient  All their questions were answered and the patient agrees to the sedation procedures as planned and the patient is deemed an appropriate candidate for the sedation as planned.    Procedure explained to patient, informed consent obtained and placed in chart.    Lenka Prather  3/14/2020  2:09 PM

## 2020-03-14 NOTE — DISCHARGE INSTRUCTIONS
Esophagitis     With esophagitis, the lining of the esophagus is inflamed.   Do you often have burning pain in your chest? You may have esophagitis. This is when the lining of the esophagus becomes red and swollen (inflamed). The esophagus is the tube that connects your throat to your stomach. This sheet tells you more about esophagitis. It also explains your treatment options.  Main types of esophagitis  Reflux esophagitis. This is the more common type. It is caused by GERD (gastroesophageal reflux disease). Stomach contents with stomach acid flow back up into the esophagus. This happens over and over. It leads to inflammation. Risk factors can include:  · Being overweight  · Asthma  · Smoking  · Pregnancy  · Frequent vomiting  · Certain medicines (such as aspirin and other anti-inflammatories)  · Hiatal hernia  Infectious esophagitis. This is caused by an infection. You are more at risk for this if you have a weakened immune system and poor nutrition. Antibiotic use can also be a factor. The infection is often due to the following:  · A type of fungus (typically candida)  · A virus, such as herpes simplex virus 1 (HSV-1) or cytomegalovirus (CMV)  Eosinophilic esophagitis. Foods or other things around you can give you an allergic reaction. This triggers an immune response and leads to esophagitis.  Pill-induced esophagitis. Certain types of medicines can cause inflammation and ulcers in the esophagus. These include doxycycline, aspirin, NSAIDs, alendronate, potassium, quinidine, iron.  Symptoms of esophagitis  The following symptoms can occur with esophagitis:  · Pain when swallowing, or trouble swallowing  · Pain behind your breastbone (heartburn)  · Acid regurgitation  · Chronic sore throat  · Gum Inflammation  · Cavities  · Bad breath  · Nausea  · Pain in your upper belly (abdomen)  · Bleeding (indicated by bright red vomit or black, tarry stool)  These symptoms occur more often with reflux  esophagitis:  · Coughing, wheezing, or asthma  · Hoarseness  Diagnosis of esophagitis  Your healthcare provider will ask about your health history and symptoms. Youll also be examined. Sometimes certain tests are needed. These may include:  · Upper endoscopy. A thin, flexible tube with a tiny light and camera is used. It is inserted through the mouth down into the esophagus. This lets the provider look for damage. A small sample of tissue (biopsy) may also be removed. The sample is sent to a lab for testing.  · Upper GI X-ray with barium. An X-ray is done after you drink a substance called barium. Barium may make problems in the esophagus easier to see on an x-ray.  · Esophageal pH. A soft, thin tube is passed into the esophagus through the nose or mouth for 24 hours. It measures the acid level in the esophagus.  · Esophageal manometry. A soft, thin tube is passed into the esophagus through the nose or mouth. It measures muscle contractions in the esophagus.  Treatment of esophagitis  Medicines. Different medicines can help treat esophagitis. The medicine used will depend on the type of esophagitis you have. Talk with your healthcare provider.  Lifestyle changes. Making the following changes can help reduce irritation and ease your symptoms:  · Avoid spicy foods (pepper, chili powder, christina). Also avoid hard foods (nuts, crackers, raw vegetables) and acidic foods and drinks (tomatoes, citrus fruits and juices). Other problem foods include chocolate, peppermint, nutmeg, and foods high in fat.  · Until you can swallow without pain, follow a combined liquid and soft diet. Try foods such as cooked cereals, mashed potatoes, and soups.  · Take small bites and chew your food thoroughly.  · Avoid large meals and heavy evening meals. Don't lie down within 2 to 3 hours of eating.  · Get to or stay at a healthy weight.  · Avoid alcohol, caffeine, and smoking or tobacco products.  · Brush and floss your teeth  · Raise your  upper body by 4 to 6 inches when lying in bed. This can be done using a foam wedge. Or put blocks under the legs at the head of your bed.  Surgery. This may be needed for severe reflux esophagitis. Other noninvasive procedures to treat GERD and esophagitis are being studied. Your provider can tell you more.  Why treatment Is important  Without treatment, esophagitis can get worse. This is especially true with severe reflux esophagitis. For instance, continued symptoms can cause scarring of the esophagus. Over time, this can cause a narrowing the esophagus (stricture). This can make it hard to pass food down to the stomach. As symptoms go on they can also cause changes in the lining of the esophagus. These changes can put you at a slightly higher risk of cancer of the esophagus.   Date Last Reviewed: 7/1/2016  © 6879-9385 [employer]. [address] [place] [address], [place] [address]. All rights reserved. This information is not intended as a substitute for professional medical care. Always follow your healthcare professional's instructions.

## 2020-03-14 NOTE — SUBJECTIVE & OBJECTIVE
Past Medical History:   Diagnosis Date    Hyperlipidemia     Hypertension        History reviewed. No pertinent surgical history.    Review of patient's allergies indicates:  No Known Allergies    No current facility-administered medications on file prior to encounter.      Current Outpatient Medications on File Prior to Encounter   Medication Sig    amLODIPine (NORVASC) 5 MG tablet Take 5 mg by mouth once daily.    aspirin/salicylamide/caffeine (BC HEADACHE POWDER ORAL) Take 1 packet by mouth 3 (three) times daily as needed (Patient taking 2-3 daily).    atorvastatin calcium (LIPITOR ORAL) Take by mouth.    cetirizine (ZYRTEC) 10 MG tablet Take 10 mg by mouth once daily.    diphenhydrAMINE (SOMINEX) 25 mg tablet Take 25 mg by mouth every evening.    fluticasone propionate (FLONASE) 50 mcg/actuation nasal spray 1 spray by Each Nostril route once daily.    [DISCONTINUED] amLODIPine (NORVASC) 5 MG tablet     [DISCONTINUED] atorvastatin calcium (LIPITOR ORAL)     [DISCONTINUED] citalopram hydrobromide (CITALOPRAM ORAL)     [DISCONTINUED] predniSONE (DELTASONE) 20 MG tablet TK 2 TS PO D FOR 3 DAYS. BEGIN TOMORROW     Family History     Reviewed and not pertinent.         Tobacco Use    Smoking status: Never Smoker    Smokeless tobacco: Never Used   Substance and Sexual Activity    Alcohol use: Yes     Frequency: 2-4 times a month     Drinks per session: 3 or 4    Drug use: Never    Sexual activity: Not Currently     Partners: Female     Review of Systems   Constitutional: Negative for appetite change, chills, diaphoresis, fatigue and fever.   HENT: Negative for congestion, ear pain, mouth sores, sore throat and trouble swallowing.    Eyes: Negative for pain and visual disturbance.   Respiratory: Negative for cough, chest tightness and shortness of breath.    Cardiovascular: Positive for palpitations. Negative for chest pain and leg swelling.   Gastrointestinal: Positive for blood in stool (melena).  Negative for abdominal pain, constipation, diarrhea and nausea.   Endocrine: Negative for cold intolerance, heat intolerance, polydipsia and polyuria.   Genitourinary: Negative for dysuria, frequency and hematuria.   Musculoskeletal: Negative for arthralgias, back pain, myalgias and neck pain.   Skin: Negative for pallor, rash and wound.   Allergic/Immunologic: Negative for environmental allergies and immunocompromised state.   Neurological: Positive for dizziness and syncope. Negative for seizures, weakness, numbness and headaches.   Hematological: Negative for adenopathy. Does not bruise/bleed easily.   Psychiatric/Behavioral: Negative for agitation, confusion and sleep disturbance.     Objective:     Vital Signs (Most Recent):  Temp: 98.4 °F (36.9 °C) (03/14/20 0334)  Pulse: 110 (03/14/20 0450)  Resp: 18 (03/14/20 0450)  BP: 125/64 (03/14/20 0450)  SpO2: 100 % (03/14/20 0450) Vital Signs (24h Range):  Temp:  [97.8 °F (36.6 °C)-98.6 °F (37 °C)] 98.4 °F (36.9 °C)  Pulse:  [] 110  Resp:  [15-20] 18  SpO2:  [97 %-100 %] 100 %  BP: ()/(52-70) 125/64     Weight: 113.5 kg (250 lb 3.6 oz)  Body mass index is 40.39 kg/m².    Physical Exam   Constitutional: He is oriented to person, place, and time. He appears well-developed and well-nourished.   HENT:   Head: Normocephalic and atraumatic.   Eyes: Conjunctivae are normal.   Neck: Neck supple. No JVD present.   Cardiovascular: Normal rate, regular rhythm and normal heart sounds.   Pulmonary/Chest: Effort normal and breath sounds normal. He has no wheezes.   Abdominal: Soft. Bowel sounds are normal. He exhibits no distension. There is no tenderness.   Musculoskeletal: Normal range of motion.   Neurological: He is alert and oriented to person, place, and time.   Skin: Skin is warm and dry. No rash noted.   Psychiatric: He has a normal mood and affect. His behavior is normal. Thought content normal.   Nursing note and vitals reviewed.          Significant Labs:    CBC:   Recent Labs   Lab 03/13/20  2355   WBC 16.49*   HGB 8.1*   HCT 24.8*        CMP:   Recent Labs   Lab 03/13/20  2355      K 4.0      CO2 23   *   BUN 49*   CREATININE 1.0   CALCIUM 8.7   PROT 5.5*   ALBUMIN 3.2*   BILITOT 0.2   ALKPHOS 45*   AST 8*   ALT 15   ANIONGAP 10   EGFRNONAA >60     All pertinent labs within the past 24 hours have been reviewed.    Significant Imaging: I have reviewed all pertinent imaging results/findings within the past 24 hours.

## 2020-03-14 NOTE — ASSESSMENT & PLAN NOTE
-Hold Norvasc due to volume loss and presumed positive orthostatics due to intolerance.   -Labetalol as needed.

## 2020-03-14 NOTE — ANESTHESIA POSTPROCEDURE EVALUATION
Anesthesia Post Evaluation    Patient: Jelani Whaley    Procedure(s) Performed: Procedure(s) (LRB):  EGD (ESOPHAGOGASTRODUODENOSCOPY) (N/A)    Final Anesthesia Type: MAC    Patient location during evaluation: OPS  Patient participation: Yes- Able to Participate  Level of consciousness: awake and alert  Post-procedure vital signs: reviewed and stable  Pain management: adequate  Airway patency: patent    PONV status at discharge: No PONV  Anesthetic complications: no      Cardiovascular status: blood pressure returned to baseline  Respiratory status: unassisted  Hydration status: euvolemic  Follow-up not needed.          Vitals Value Taken Time   /67 3/14/2020  2:19 PM   Temp 38.6 °C (101.5 °F) 3/14/2020  2:10 PM   Pulse 100 3/14/2020  2:19 PM   Resp 18 3/14/2020  1:44 PM   SpO2 99 % 3/14/2020  2:19 PM         No case tracking events are documented in the log.      Pain/Brian Score: No data recorded

## 2020-03-14 NOTE — ED NOTES
Pt. Had brief syncopal episode in triage while sitting in WC, lasting approximately 10secs. Triage halted and pt. Taken immediately to ED1 via WC. Pt. Helped onto stretcher. Multiple RNs at BS. CN aware.

## 2020-03-14 NOTE — ANESTHESIA PREPROCEDURE EVALUATION
03/14/2020  Jelani Whaley is a 49 y.o., male.    Pre-op Assessment    I have reviewed the Patient Summary Reports.    I have reviewed the Nursing Notes.   I have reviewed the Medications.     Review of Systems  Anesthesia Hx:  No problems with previous Anesthesia  History of prior surgery of interest to airway management or planning: Previous anesthesia: General  Denies Personal Hx of Anesthesia complications.   Social:  Non-Smoker    Hematology/Oncology:  Hematology Normal   Oncology Normal     EENT/Dental:EENT/Dental Normal   Cardiovascular:   Exercise tolerance: good Hypertension  Functional Capacity good / => 4 METS    Pulmonary:  Pulmonary Normal    Renal/:  Renal/ Normal     Hepatic/GI:  Hepatic/GI Normal    Musculoskeletal:  Musculoskeletal Normal    Neurological:  Neurology Normal    Endocrine:  Endocrine Normal    Dermatological:  Skin Normal    Psych:  Psychiatric Normal           Physical Exam  General:  Well nourished, Morbid Obesity    Airway/Jaw/Neck:  Airway Findings: Mouth Opening: Normal Tongue: Normal  Mallampati: III      Dental:  Dental Findings: In tact   Chest/Lungs:  Chest/Lungs Clear    Heart/Vascular:  Heart Findings: Normal Heart murmur: negative       Mental Status:  Mental Status Findings:  Cooperative, Alert and Oriented         Anesthesia Plan  Type of Anesthesia, risks & benefits discussed:  Anesthesia Type:  MAC  Patient's Preference:   Intra-op Monitoring Plan: standard ASA monitors  Intra-op Monitoring Plan Comments:   Post Op Pain Control Plan: multimodal analgesia  Post Op Pain Control Plan Comments:   Induction:   IV  Beta Blocker:  Patient is not currently on a Beta-Blocker (No further documentation required).       Informed Consent: Patient understands risks and agrees with Anesthesia plan.  Questions answered. Anesthesia consent signed with patient.  ASA Score:  2  emergent   Day of Surgery Review of History & Physical: I have interviewed and examined the patient. I have reviewed the patient's H&P dated:    H&P update referred to the surgeon.

## 2020-03-14 NOTE — [employer] PATIENT INSTRUCTIONS
Discharge Summary/Instructions after an Endoscopic Procedure  Patient Name: Jelani Diez  Patient MRN: [id number]  Patient YOB: 1970 Saturday, March 14, 2020 Lenka Prather MD  RESTRICTIONS:  During your procedure today, you received medications for sedation.  These   medications may affect your judgment, balance and coordination.  Therefore,   for 24 hours, you have the following restrictions:   - DO NOT drive a car, operate machinery, make legal/financial decisions,   sign important papers or drink alcohol.    ACTIVITY:  Today: no heavy lifting, straining or running due to procedural   sedation/anesthesia.  The following day: return to full activity including work.  DIET:  Eat and drink normally unless instructed otherwise.     TREATMENT FOR COMMON SIDE EFFECTS:  - Mild abdominal pain, nausea, belching, bloating or excessive gas:  rest,   eat lightly and use a heating pad.  - Sore Throat: treat with throat lozenges and/or gargle with warm salt   water.  - Because air was used during the procedure, expelling large amounts of air   from your rectum or belching is normal.  - If a bowel prep was taken, you may not have a bowel movement for 1-3 days.    This is normal.  SYMPTOMS TO WATCH FOR AND REPORT TO YOUR PHYSICIAN:  1. Abdominal pain or bloating, other than gas cramps.  2. Chest pain.  3. Back pain.  4. Signs of infection such as: chills or fever occurring within 24 hours   after the procedure.  5. Rectal bleeding, which would show as bright red, maroon, or black stools.   (A tablespoon of blood from the rectum is not serious, especially if   hemorrhoids are present.)  6. Vomiting.  7. Weakness or dizziness.  GO DIRECTLY TO THE NEAREST EMERGENCY ROOM IF YOU HAVE ANY OF THE FOLLOWING:      Difficulty breathing              Chills and/or fever over 101 F   Persistent vomiting and/or vomiting blood   Severe abdominal pain   Severe chest pain   Black, tarry stools   Bleeding- more than one  tablespoon   Any other symptom or condition that you feel may need urgent attention  Your doctor recommends these additional instructions:  If any biopsies were taken, your doctors clinic will contact you in 1 to 2   weeks with any results.  - [facility] patient to [facility] for [facility] care.   - Switch to twice daily protonix 40 mg foro total of 8 weeks  Can start clear diet and advance diet as tolerated  Check Stool H.pylori antigen and if positive will treat  Serial H/H and transfuse as needed   For questions, problems or results please call your physician Lenka Prather MD at Work:  [phone]  If you have any questions about the above instructions, call the GI   department at [phone] or call the endoscopy unit at [phone]   from 7am until 3 pm.  [facility] [place], EMERGENCY ROOM PHONE NUMBER:   [phone]  IF A COMPLICATION OR EMERGENCY SITUATION ARISES AND YOU ARE UNABLE TO REACH   YOUR PHYSICIAN - GO DIRECTLY TO THE EMERGENCY ROOM.  I have read or have had read to me these discharge instructions for my   procedure and have received a written copy.  I understand these   instructions and will follow-up with my physician if I have any questions.     __________________________________       _____________________________________  Nurse Signature                                          Patient/Designated   Responsible Party Signature  MD Lenka Mcgill MD  3/14/2020 2:40:59 PM  This report has been verified and signed electronically.  [employer]

## 2020-03-14 NOTE — ASSESSMENT & PLAN NOTE
-Hold Norvasc due to volume loss and presumed positive orthostatics due to intolerance.   -Labetalol as needed.   3/14- BP is marginal . Continue to monitor vitals

## 2020-03-14 NOTE — SUBJECTIVE & OBJECTIVE
Interval History:    BP is marginal . Mild tachycardia is again noted . Tmax 100.4 likely related to blood transfusion. S/P 1 unit of P-RBC completed early this morning . Repeat H&H 7.8/24.8 which is unchanged from admit H&H. Possibly due to active bleed. GI consult obtained . Plan to transfuse 2nd unit of  P-RBC . Continue PPI infusion and EGD today .      Review of Systems   Constitutional: Negative for activity change, appetite change and fever.   HENT: Negative for sore throat.    Eyes: Negative for visual disturbance.   Respiratory: Negative for cough, chest tightness and shortness of breath.    Cardiovascular: Negative for chest pain, palpitations and leg swelling.   Gastrointestinal: Positive for abdominal pain (mild epigastric tenderness ) and nausea (resolved ). Negative for abdominal distention, constipation, diarrhea and vomiting.   Endocrine: Negative for polyuria.   Genitourinary: Negative for decreased urine volume, dysuria, flank pain, frequency and hematuria.   Musculoskeletal: Negative for back pain and gait problem.   Skin: Negative for rash.   Neurological: Negative for syncope, speech difficulty, weakness, light-headedness and headaches.   Psychiatric/Behavioral: Negative for confusion, hallucinations and sleep disturbance.     Objective:     Vital Signs (Most Recent):  Temp: 99.5 °F (37.5 °C) (03/14/20 1145)  Pulse: 104 (03/14/20 1145)  Resp: 16 (03/14/20 1145)  BP: (!) 107/56 (03/14/20 1145)  SpO2: 97 % (03/14/20 1145) Vital Signs (24h Range):  Temp:  [97.8 °F (36.6 °C)-100.4 °F (38 °C)] 99.5 °F (37.5 °C)  Pulse:  [] 104  Resp:  [15-20] 16  SpO2:  [97 %-100 %] 97 %  BP: ()/(51-70) 107/56     Weight: 117.5 kg (259 lb 0.7 oz)  Body mass index is 41.81 kg/m².    Intake/Output Summary (Last 24 hours) at 3/14/2020 1309  Last data filed at 3/14/2020 0620  Gross per 24 hour   Intake 1415 ml   Output 300 ml   Net 1115 ml      Physical Exam   Constitutional: He is oriented to person, place,  and time. He appears well-developed and well-nourished. No distress.   HENT:   Head: Normocephalic and atraumatic.   Mouth/Throat: No oropharyngeal exudate.   Eyes: Pupils are equal, round, and reactive to light. Conjunctivae and EOM are normal.   Neck: Normal range of motion. Neck supple. No JVD present. No thyromegaly present.   Cardiovascular: Normal rate, regular rhythm and normal heart sounds.   No murmur heard.  Pulmonary/Chest: Effort normal and breath sounds normal. No respiratory distress. He has no wheezes. He has no rales. He exhibits no tenderness.   Abdominal: Soft. Bowel sounds are normal. He exhibits no distension. There is tenderness (mild epigastric tenderness ). There is no rebound and no guarding.   Obese abdomen .    Musculoskeletal: Normal range of motion. He exhibits no edema.   Lymphadenopathy:     He has no cervical adenopathy.   Neurological: He is alert and oriented to person, place, and time. He displays normal reflexes. No cranial nerve deficit or sensory deficit.   Skin: Skin is warm and dry. No rash noted. He is not diaphoretic.   Psychiatric: He has a normal mood and affect.       Significant Labs:   BMP:   Recent Labs   Lab 03/13/20  2355   *      K 4.0      CO2 23   BUN 49*   CREATININE 1.0   CALCIUM 8.7     CBC:   Recent Labs   Lab 03/13/20 2355 03/14/20  0810   WBC 16.49* 10.17   HGB 8.1* 7.8*   HCT 24.8* 24.8*    141*     CMP:   Recent Labs   Lab 03/13/20  2355      K 4.0      CO2 23   *   BUN 49*   CREATININE 1.0   CALCIUM 8.7   PROT 5.5*   ALBUMIN 3.2*   BILITOT 0.2   ALKPHOS 45*   AST 8*   ALT 15   ANIONGAP 10   EGFRNONAA >60       Significant Imaging:

## 2020-03-14 NOTE — HPI
Clinic Consult:  [facility] Consultation Note    Reason for Consult:  The primary encounter diagnosis was Upper GI bleed. A diagnosis of Weakness was also pertinent to this visit.    PCP: Primary Doctor No   No address on file    HPI:    Mr. Judd tobin this is a 49-year-old male admitted with upper gastrointestinal bleeding presenting with melena.  Has been suffering from sinusitis and upper respiratory tract infection and a couple weeks ago was given steroids.  In addition to that he has been taking BC powder and went to the GI clinic last week for a colon cancer screening schedule an office appointment.  On Thursday has noticed black tarry stools along with his fatigue and tiredness he has felt lightheaded as well and came to the hospital last night and felt was found to be hypotensive.  Lab testing showed a hemoglobin of 8.1 hematocrit of 24.8 a.m. also had a white count of 16.49 platelet count of 202.  BUN was elevated 49 with a normal creatinine.     Other symptoms include abdominal pain in the epigastric area that is burning and nonradiating, moderate in severity.  Also had palpitations, nausea, shortness of breath.  Possible loss of consciousness as he is unsure and on admission he is not aware that he blacked out but was not completely aware of his surroundings.  He has denied any hematemesis, hematochezia, bleeding elsewhere, use of oral anticoagulants, prior upper endoscopies, colonoscopy, family history of gastrointestinal malignancies.  He has received 1 unit of blood and his repeat CBC showed a hemoglobin of 7.8 and hematocrit of 24.8 with a white count of 10 point 17 and platelet count of 141. No history of liver disease. Currently he is being prepared for a 2nd unit of blood.  Currently in telemetry bed.    Past medical history is significant for hypertension, dyslipidemia and obesity.    ROS:  CONSTITUTIONAL: Denies weight change,  fatigue, fevers, chills, night sweats.  EYES: No changes  in vision.   ENT: No oral lesions or sore throat.  HEMATOLOGICAL/Lymph: Denies bleeding tendency, bruising tendency. No swellings or enlarged lymph nodes.  CARDIOVASCULAR: Denies chest pain  RESPIRATORY: Denies cough, hemoptysis, dyspnea, and wheezing.  GI: See HPI.  : Denies dysuria and hematuria  MUSCULOSKELETAL: Denies joint pain or swelling, back pain and muscle pain.  SKIN: Denies rashes.  NEUROLOGIC: Denies headaches, seizures and numbness.  PSYCHIATRIC: Denies depression or anxiety.  ENDOCRINE: Denies heat or cold intolerance and excessive thirst or urination.    Medical History:   Past Medical History:   Diagnosis Date    Hyperlipidemia     Hypertension        Surgical History:  History reviewed. No pertinent surgical history.    Family History:   History reviewed. No pertinent family history.    Social History:   Social History     Tobacco Use    Smoking status: Never Smoker    Smokeless tobacco: Never Used   Substance Use Topics    Alcohol use: Yes     Frequency: 2-4 times a month     Drinks per session: 3 or 4    Drug use: Never       Allergies: Reviewed    Home Medications:   Current Discharge Medication List      CONTINUE these medications which have NOT CHANGED    Details   amLODIPine (NORVASC) 5 MG tablet Take 5 mg by mouth once daily.      aspirin/salicylamide/caffeine (BC HEADACHE POWDER ORAL) Take 1 packet by mouth 3 (three) times daily as needed (Patient taking 2-3 daily).      atorvastatin calcium (LIPITOR ORAL) Take by mouth.      cetirizine (ZYRTEC) 10 MG tablet Take 10 mg by mouth once daily.      diphenhydrAMINE (SOMINEX) 25 mg tablet Take 25 mg by mouth every evening.      fluticasone propionate (FLONASE) 50 mcg/actuation nasal spray 1 spray by Each Nostril route once daily.         STOP taking these medications       peg-electrolyte soln (NULYTELY WITH FLAVOR PACKS) 420 gram SolR Comments:   Reason for Stopping:         citalopram hydrobromide (CITALOPRAM ORAL) Comments:   Reason  "for Stopping:         predniSONE (DELTASONE) 20 MG tablet Comments:   Reason for Stopping:                 Physical Exam:  Vital Signs:  BP (!) (P) 98/54   Pulse (P) 105   Temp (P) 99.1 °F (37.3 °C) (Oral)   Resp (P) 16   Ht 5' 6" (1.676 m)   Wt 113.5 kg (250 lb 3.6 oz)   SpO2 (P) 97%   BMI 40.39 kg/m²   Body mass index is 40.39 kg/m².      GENERAL: No acute distress, A&Ox3  EYES: Anicteric, pale conjunctivae noted.  ENT: OP clear  NECK: Supple, no masses, no thyromegally.  CHEST: Equal breath sounds bilaterally, no wheezing.  CARDIOVASCULAR: Regular rate and rhythm. Murmurs, rubs and gallops absent.  ABDOMEN: soft, non-tender, non-distended, normal bowel sounds, no hepatosplenomegaly   EXTREMITIES: No clubbing, cyanosis or edema.  SKIN: Without lesion or erythema.  LYMPH: No cervical, axillary lymphadenopathy palpable.   NEUROLOGICAL: Grossly normal, no asterixis present.    Labs: Pertinent labs reviewed.    Assessment and Plan:  Upper GI bleed    Weakness  -     EKG 12-lead; Standing    Other orders  -     HIV 1/2 Ag/Ab (4th Gen); Standing  -     Saline lock IV; Standing  -     Cardiac Monitoring - Adult; Standing  -     CBC auto differential; Standing  -     Comprehensive metabolic panel; Standing  -     Urinalysis - Clean Catch; Standing  -     Protime-INR; Standing  -     sodium chloride 0.9% bolus 1,000 mL  -     Occult blood x 1, stool; Standing  -     Type & Screen; Standing  -     Prepare RBC 2 Units; transfusion; Standing  -     0.9%  NaCl infusion (for blood administration)  -     Transfuse RBC 2 Units; Standing  -     pantoprazole injection 40 mg  -     Cancel: CTA Abdomen; Standing  -     CTA Abdomen and Pelvis; Standing  -     iohexoL (OMNIPAQUE 350) injection 100 mL  -     barium sulfate (VOLUMEN) suspension 1,350 mL  -     Critical Care; Standing  -     Place in Observation; Standing  -     Cardiac Monitoring - Adult; Standing  -     Vital signs; Standing  -     Orthostatic blood pressure With " vital signs; Standing  -     Progressive Mobility Protocol (mobilize patient to their highest level of functioning at least twice daily); Standing  -     Check Type and Screen complete; Standing  -     Large Bore IV Access; Standing  -     Diet NPO Except for: Sips with Medication; Standing  -     IP VTE HIGH RISK PATIENT; Standing  -     CBC auto differential; Standing  -     CBC auto differential; Standing  -     Check consent to blood transfusion; Standing  -     Cancel: Full code; Standing  -     Place sequential compression device; Standing  -     pantoprazole injection 40 mg  -     Inpatient consult to Gastroenterology; Standing  -     Progressive Mobility Protocol (mobilize patient to their highest level of functioning at least twice daily); Standing  -     acetaminophen tablet 650 mg  -     HYDROcodone-acetaminophen 5-325 mg per tablet 1 tablet  -     ondansetron disintegrating tablet 8 mg  -     Basic metabolic panel; Standing  -     Magnesium; Standing  -     Phosphorus; Standing  -     CBC auto differential; Standing  -     Full code; Standing  -     Place sequential compression device; Standing  -     promethazine (PHENERGAN) 6.25 mg in dextrose 5 % 50 mL IVPB  -     labetaloL injection 10 mg  -     sodium chloride 0.9% bolus 1,000 mL  -     0.9%  NaCl infusion  -     Cancel: Hemoglobin; Standing  -     Cancel: Hematocrit; Standing  -     Orthostatic blood pressure Notify MD with result; Standing  -     Admit to Inpatient; Standing          No follow-ups on file.

## 2020-03-14 NOTE — PROGRESS NOTES
[facility] Hospital Medicine  Progress Note    Patient Name: Jelani Whaley  MRN: [id number]  Patient Class: IP- Inpatient   Admission Date: 3/13/2020  Length of Stay: 0 days  Attending Physician: Merlene Mclean MD  Primary Care Provider: Primary Doctor No        Subjective:     Principal Problem:Upper GI bleed        HPI:  Jelani Whaley is a 49 year old male with hypertension and hyperlipidemia who presented to the ED with reports of melena that began one day prior to presentation. The patient reports that he awoke during the night prior to presentation, had multiple black stools, which were initially formed, but became tarry. There was associated dizziness and possible syncope relieved by laying on the ground. The patient also notes associated palpitations and mild nausea two days prior to presentation. He denies abdominal pain, vomiting, bright red blood per rectum and prior history of GI bleeding. He reports taking 2-3 packets of BC powders daily and was recently prescribed steroids for an upper respiratory illness. In the ED, the patient was tachycardic and hypotensive. His hemoglobin was 8.1 with no prior measurement available. Other labs were significant for a BUN of 49 with a creatinine of 1, glucose of 195, albumin of 3.2 and total protein of 5.5. CTA of the abdomen is pending.     Overview/Hospital Course:  3/14- Pt is seen at bedside . Reports one bowel movement since came to the floor. Not sure stool color. C/O some nausea and upper abdominal discomfort before came to the hospital . Denies abdominal cramp or vomiting . BP is marginal . Mild tachycardia is again noted . Tmax 100.4 likely related to blood transfusion. S/P 1 unit of P-RBC completed early this morning . Repeat H&H 7.8/24.8 which is unchanged from admit H&H. Possibly due to active bleed. GI consult obtained . Plan to transfuse 2nd unit of  P-RBC . Continue PPI infusion and EGD today .     Interval History:    BP is  marginal . Mild tachycardia is again noted . Tmax 100.4 likely related to blood transfusion. S/P 1 unit of P-RBC completed early this morning . Repeat H&H 7.8/24.8 which is unchanged from admit H&H. Possibly due to active bleed. GI consult obtained . Plan to transfuse 2nd unit of  P-RBC . Continue PPI infusion and EGD today .      Review of Systems   Constitutional: Negative for activity change, appetite change and fever.   HENT: Negative for sore throat.    Eyes: Negative for visual disturbance.   Respiratory: Negative for cough, chest tightness and shortness of breath.    Cardiovascular: Negative for chest pain, palpitations and leg swelling.   Gastrointestinal: Positive for abdominal pain (mild epigastric tenderness ) and nausea (resolved ). Negative for abdominal distention, constipation, diarrhea and vomiting.   Endocrine: Negative for polyuria.   Genitourinary: Negative for decreased urine volume, dysuria, flank pain, frequency and hematuria.   Musculoskeletal: Negative for back pain and gait problem.   Skin: Negative for rash.   Neurological: Negative for syncope, speech difficulty, weakness, light-headedness and headaches.   Psychiatric/Behavioral: Negative for confusion, hallucinations and sleep disturbance.     Objective:     Vital Signs (Most Recent):  Temp: 99.5 °F (37.5 °C) (03/14/20 1145)  Pulse: 104 (03/14/20 1145)  Resp: 16 (03/14/20 1145)  BP: (!) 107/56 (03/14/20 1145)  SpO2: 97 % (03/14/20 1145) Vital Signs (24h Range):  Temp:  [97.8 °F (36.6 °C)-100.4 °F (38 °C)] 99.5 °F (37.5 °C)  Pulse:  [] 104  Resp:  [15-20] 16  SpO2:  [97 %-100 %] 97 %  BP: ()/(51-70) 107/56     Weight: 117.5 kg (259 lb 0.7 oz)  Body mass index is 41.81 kg/m².    Intake/Output Summary (Last 24 hours) at 3/14/2020 1309  Last data filed at 3/14/2020 0620  Gross per 24 hour   Intake 1415 ml   Output 300 ml   Net 1115 ml      Physical Exam   Constitutional: He is oriented to person, place, and time. He appears  well-developed and well-nourished. No distress.   HENT:   Head: Normocephalic and atraumatic.   Mouth/Throat: No oropharyngeal exudate.   Eyes: Pupils are equal, round, and reactive to light. Conjunctivae and EOM are normal.   Neck: Normal range of motion. Neck supple. No JVD present. No thyromegaly present.   Cardiovascular: Normal rate, regular rhythm and normal heart sounds.   No murmur heard.  Pulmonary/Chest: Effort normal and breath sounds normal. No respiratory distress. He has no wheezes. He has no rales. He exhibits no tenderness.   Abdominal: Soft. Bowel sounds are normal. He exhibits no distension. There is tenderness (mild epigastric tenderness ). There is no rebound and no guarding.   Obese abdomen .    Musculoskeletal: Normal range of motion. He exhibits no edema.   Lymphadenopathy:     He has no cervical adenopathy.   Neurological: He is alert and oriented to person, place, and time. He displays normal reflexes. No cranial nerve deficit or sensory deficit.   Skin: Skin is warm and dry. No rash noted. He is not diaphoretic.   Psychiatric: He has a normal mood and affect.       Significant Labs:   BMP:   Recent Labs   Lab 03/13/20  2355   *      K 4.0      CO2 23   BUN 49*   CREATININE 1.0   CALCIUM 8.7     CBC:   Recent Labs   Lab 03/13/20 2355 03/14/20  0810   WBC 16.49* 10.17   HGB 8.1* 7.8*   HCT 24.8* 24.8*    141*     CMP:   Recent Labs   Lab 03/13/20  2355      K 4.0      CO2 23   *   BUN 49*   CREATININE 1.0   CALCIUM 8.7   PROT 5.5*   ALBUMIN 3.2*   BILITOT 0.2   ALKPHOS 45*   AST 8*   ALT 15   ANIONGAP 10   EGFRNONAA >60       Significant Imaging:       Assessment/Plan:      * Upper GI bleed  3/13-Transfuse 2 units PRBC.   -Monitor hemoglobin and hematocrit.   -IV BID Protonix.   -GI consult.   -Patient counseled on cessation of BC powder use.   3/14 - EGD today    Symptomatic anemia  -Transfuse 2 units PRBC.   -Monitor hemoglobin and hematocrit.      Hyperlipidemia  Resume statin when appropriate.       Hypertension  -Hold Norvasc due to volume loss and presumed positive orthostatics due to intolerance.   -Labetalol as needed.   3/14- BP is marginal . Continue to monitor vitals         VTE Risk Mitigation (From admission, onward)         Ordered     IP VTE HIGH RISK PATIENT  Once      03/14/20 0440     Place sequential compression device  Until discontinued      03/14/20 0440     Place sequential compression device  Until discontinued      03/14/20 0440                      Merlene Mclean MD  Department of Hospital Medicine   [facility]

## 2020-03-14 NOTE — NURSING
Patient arrived to bed  from ED accompanied by nurse. Transferred to bed. Heart monitor in place, vital signs taken.   Patient is aaox4 with no distress noted.  Assessment as charted.   Patient has been orientated to room, call light, fall precautions, and board. No questions or concerns at this time.

## 2020-03-14 NOTE — ASSESSMENT & PLAN NOTE
3/13-Transfuse 2 units PRBC.   -Monitor hemoglobin and hematocrit.   -IV BID Protonix.   -GI consult.   -Patient counseled on cessation of BC powder use.   3/14 - EGD today

## 2020-03-14 NOTE — HPI
Jelani Whaley is a 49 year old male with hypertension and hyperlipidemia who presented to the ED with reports of melena that began one day prior to presentation. The patient reports that he awoke during the night prior to presentation, had multiple black stools, which were initially formed, but became tarry. There was associated dizziness and possible syncope relieved by laying on the ground. The patient also notes associated palpitations and mild nausea two days prior to presentation. He denies abdominal pain, vomiting, bright red blood per rectum and prior history of GI bleeding. He reports taking 2-3 packets of BC powders daily and was recently prescribed steroids for an upper respiratory illness. In the ED, the patient was tachycardic and hypotensive. His hemoglobin was 8.1 with no prior measurement available. Other labs were significant for a BUN of 49 with a creatinine of 1, glucose of 195, albumin of 3.2 and total protein of 5.5. CTA of the abdomen is pending.

## 2020-03-14 NOTE — ED PROVIDER NOTES
SCRIBE #1 NOTE: I, Cara Pritchard, am scribing for, and in the presence of, Ginette Saucedo MD. I have scribed the HPI, ROS, PEx.     SCRIBE #2 NOTE: I, Mary Pearl, am scribing for, and in the presence of,  Kevin Baker MD. I have scribed the remaining portions of the note not scribed by Scribe #1.      History     Chief Complaint   Patient presents with    Melena     S/O 24hr ago; Pt. states that stool is tarry/dark black. Estimates 5-6 BMs last night and 1 BM today with blood. Pt. pale.    Weakness    Loss of Consciousness     Pt. reports multiple syncopal episodes between last night and today with standing/walking. Denies hitting head in syncopal episodes.     Review of patient's allergies indicates:  No Known Allergies      History of Present Illness     HPI    3/13/2020, 11:55 PM  History obtained from the patient      History of Present Illness: Jelani Whaley is a 49 y.o. male patient who presents to the Emergency Department for evaluation of melena which onset gradually yesterday. Pt reports x5-6 episodes yesterday and 1 episode today. Pt reports feeling weak accompanied by multiple syncopal episodes between last night and today. Symptoms are episodic and moderate in severity. No mitigating or exacerbating factors reported. No other associated sxs reported. Patient denies any fever, chills, numbness, n/v/d, abd pain, head injury/trauma, CP, SOB, palpitations, and all other sxs at this time. No further complaints or concerns at this time.       Arrival mode: Personal vehicle      PCP: Primary Doctor No        Past Medical History:  History reviewed. No pertinent past medical history.       Past Surgical History:  History reviewed. No pertinent past surgical history.       Family History:  History reviewed. No pertinent family history.       Social History:  Social History     Tobacco Use    Smoking status: Never Smoker    Smokeless tobacco: Never Used   Substance and Sexual Activity     Alcohol use: Yes     Frequency: 2-4 times a month     Drinks per session: 3 or 4    Drug use: Never    Sexual activity: Not Currently     Partners: Female        Review of Systems     Review of Systems   Constitutional: Negative for chills and fever.   HENT: Negative for sore throat.         (-) head injury/trauma   Respiratory: Negative for shortness of breath.    Cardiovascular: Negative for chest pain and palpitations.   Gastrointestinal: Negative for abdominal pain, diarrhea, nausea and vomiting.        (+) melena   Genitourinary: Negative for dysuria.   Musculoskeletal: Negative for back pain.   Skin: Negative for rash.   Neurological: Positive for syncope and weakness. Negative for numbness.   Hematological: Does not bruise/bleed easily.   All other systems reviewed and are negative.       Physical Exam     Initial Vitals [03/13/20 2339]   BP Pulse Resp Temp SpO2   (!) 87/52 (!) 127 18 97.8 °F (36.6 °C) 97 %      MAP       --          Physical Exam  Nursing Notes and Vital Signs Reviewed.  Constitutional: Patient is in mild distress. Pale and diaphoretic.  Head: Atraumatic. Normocephalic.  Eyes: PERRL. EOM intact. Conjunctivae are not pale. No scleral icterus.  ENT: Mucous membranes are moist. Oropharynx is clear and symmetric.    Neck: Supple. Full ROM. No lymphadenopathy.  Cardiovascular: Tachycardic. Regular rhythm. No murmurs, rubs, or gallops. Distal pulses are 2+ and symmetric.  Pulmonary/Chest: No respiratory distress. Clear to auscultation bilaterally. No wheezing or rales.  Abdominal: Soft and non-distended.  There is no tenderness.  No rebound, guarding, or rigidity. Good bowel sounds.  Genitourinary: No CVA tenderness  Musculoskeletal: Moves all extremities. No obvious deformities. No edema. No calf tenderness.  Rectal: Male chaperone present for the duration of the rectal exam.There is no tenderness. No masses. Normal sphincter tone. Gross melena.   Skin: Warm and dry.  Neurological:  Alert,  awake, and appropriate.  Normal speech.  No acute focal neurological deficits are appreciated.  Psychiatric: Normal affect. Good eye contact. Appropriate in content.     ED Course   Critical Care  Date/Time: 3/14/2020 3:31 AM  Performed by: Kevin Baker MD  Authorized by: Kevin Baker MD   Direct patient critical care time: 10 minutes  Additional history critical care time: 10 minutes  Ordering / reviewing critical care time: 10 minutes  Documentation critical care time: 5 minutes  Total critical care time (exclusive of procedural time) : 35 minutes  Critical care time was exclusive of separately billable procedures and treating other patients and teaching time.  Critical care was necessary to treat or prevent imminent or life-threatening deterioration of the following conditions: GI bleed with hypotension.  Critical care was time spent personally by me on the following activities: blood draw for specimens, development of treatment plan with patient or surrogate, interpretation of cardiac output measurements, evaluation of patient's response to treatment, examination of patient, obtaining history from patient or surrogate, ordering and performing treatments and interventions, ordering and review of laboratory studies, ordering and review of radiographic studies, pulse oximetry, re-evaluation of patient's condition and review of old charts.        ED Vital Signs:  Vitals:    03/14/20 1554 03/14/20 1700 03/14/20 1923 03/14/20 2100   BP: (!) 115/59  120/65    Pulse: 88 90 86 80   Resp: 18  18    Temp: 99.1 °F (37.3 °C)  98.5 °F (36.9 °C)    TempSrc:   Oral    SpO2: 100%  99%    Weight:       Height:        03/14/20 2300 03/14/20 2318 03/15/20 0125 03/15/20 0300   BP:  125/68     Pulse: 84 92 82 68   Resp:  18     Temp:  99.3 °F (37.4 °C)     TempSrc:  Oral     SpO2:  98%     Weight:       Height:        03/15/20 0352 03/15/20 0503 03/15/20 0721 03/15/20 0900   BP: (!) 118/59  119/70    Pulse: 79 76 77 72    Resp: 18  16    Temp: 98.5 °F (36.9 °C)  97.2 °F (36.2 °C)    TempSrc: Oral      SpO2: 97%  97%    Weight: 114.1 kg (251 lb 8.7 oz)      Height:        03/15/20 1100 03/15/20 1200 03/15/20 1300   BP:  132/83    Pulse: 80 82 82   Resp:  18    Temp:  98.3 °F (36.8 °C)    TempSrc:      SpO2:  100%    Weight:      Height:          Abnormal Lab Results:  Labs Reviewed   CBC W/ AUTO DIFFERENTIAL - Abnormal; Notable for the following components:       Result Value    WBC 16.49 (*)     RBC 2.54 (*)     Hemoglobin 8.1 (*)     Hematocrit 24.8 (*)     Mean Corpuscular Hemoglobin 31.9 (*)     Immature Granulocytes 1.2 (*)     Gran # (ANC) 10.4 (*)     Immature Grans (Abs) 0.20 (*)     All other components within normal limits   COMPREHENSIVE METABOLIC PANEL - Abnormal; Notable for the following components:    Glucose 195 (*)     BUN, Bld 49 (*)     Total Protein 5.5 (*)     Albumin 3.2 (*)     Alkaline Phosphatase 45 (*)     AST 8 (*)     All other components within normal limits   OCCULT BLOOD X 1, STOOL - Abnormal; Notable for the following components:    Occult Blood Positive (*)     All other components within normal limits   HIV 1 / 2 ANTIBODY   URINALYSIS   PROTIME-INR   TYPE & SCREEN        All Lab Results:  Results for orders placed or performed during the hospital encounter of 03/13/20   HIV 1/2 Ag/Ab (4th Gen)   Result Value Ref Range    HIV 1/2 Ag/Ab Negative Negative   CBC auto differential   Result Value Ref Range    WBC 16.49 (H) 3.90 - 12.70 K/uL    RBC 2.54 (L) 4.60 - 6.20 M/uL    Hemoglobin 8.1 (L) 14.0 - 18.0 g/dL    Hematocrit 24.8 (L) 40.0 - 54.0 %    Mean Corpuscular Volume 98 82 - 98 fL    Mean Corpuscular Hemoglobin 31.9 (H) 27.0 - 31.0 pg    Mean Corpuscular Hemoglobin Conc 32.7 32.0 - 36.0 g/dL    RDW 14.0 11.5 - 14.5 %    Platelets 227 150 - 350 K/uL    MPV 10.9 9.2 - 12.9 fL    Immature Granulocytes 1.2 (H) 0.0 - 0.5 %    Gran # (ANC) 10.4 (H) 1.8 - 7.7 K/uL    Immature Grans (Abs) 0.20 (H) 0.00 -  0.04 K/uL    Lymph # 4.8 1.0 - 4.8 K/uL    Mono # 1.0 0.3 - 1.0 K/uL    Eos # 0.0 0.0 - 0.5 K/uL    Baso # 0.05 0.00 - 0.20 K/uL    nRBC 0 0 /100 WBC    Gran% 63.1 38.0 - 73.0 %    Lymph% 29.2 18.0 - 48.0 %    Mono% 6.0 4.0 - 15.0 %    Eosinophil% 0.2 0.0 - 8.0 %    Basophil% 0.3 0.0 - 1.9 %    Differential Method Automated    Comprehensive metabolic panel   Result Value Ref Range    Sodium 141 136 - 145 mmol/L    Potassium 4.0 3.5 - 5.1 mmol/L    Chloride 108 95 - 110 mmol/L    CO2 23 23 - 29 mmol/L    Glucose 195 (H) 70 - 110 mg/dL    BUN, Bld 49 (H) 6 - 20 mg/dL    Creatinine 1.0 0.5 - 1.4 mg/dL    Calcium 8.7 8.7 - 10.5 mg/dL    Total Protein 5.5 (L) 6.0 - 8.4 g/dL    Albumin 3.2 (L) 3.5 - 5.2 g/dL    Total Bilirubin 0.2 0.1 - 1.0 mg/dL    Alkaline Phosphatase 45 (L) 55 - 135 U/L    AST 8 (L) 10 - 40 U/L    ALT 15 10 - 44 U/L    Anion Gap 10 8 - 16 mmol/L    eGFR if African American >60 >60 mL/min/1.73 m^2    eGFR if non African American >60 >60 mL/min/1.73 m^2   Urinalysis - Clean Catch   Result Value Ref Range    Specimen UA Urine, Clean Catch     Color, UA Yellow Yellow, Straw, Melani    Appearance, UA Clear Clear    pH, UA 6.0 5.0 - 8.0    Specific Gravity, UA 1.020 1.005 - 1.030    Protein, UA Negative Negative    Glucose, UA Negative Negative    Ketones, UA Negative Negative    Bilirubin (UA) Negative Negative    Occult Blood UA Negative Negative    Nitrite, UA Negative Negative    Urobilinogen, UA Negative <2.0 EU/dL    Leukocytes, UA Negative Negative   Protime-INR   Result Value Ref Range    Prothrombin Time 10.4 9.0 - 12.5 sec    INR 1.0 0.8 - 1.2   Occult blood x 1, stool   Result Value Ref Range    Occult Blood Positive (A) Negative   CBC auto differential   Result Value Ref Range    WBC 10.17 3.90 - 12.70 K/uL    RBC 2.49 (L) 4.60 - 6.20 M/uL    Hemoglobin 7.8 (L) 14.0 - 18.0 g/dL    Hematocrit 24.8 (L) 40.0 - 54.0 %    Mean Corpuscular Volume 100 (H) 82 - 98 fL    Mean Corpuscular Hemoglobin 31.3  (H) 27.0 - 31.0 pg    Mean Corpuscular Hemoglobin Conc 31.5 (L) 32.0 - 36.0 g/dL    RDW 15.4 (H) 11.5 - 14.5 %    Platelets 141 (L) 150 - 350 K/uL    MPV 11.2 9.2 - 12.9 fL    Immature Granulocytes 0.8 (H) 0.0 - 0.5 %    Gran # (ANC) 9.2 (H) 1.8 - 7.7 K/uL    Immature Grans (Abs) 0.08 (H) 0.00 - 0.04 K/uL    Lymph # 0.7 (L) 1.0 - 4.8 K/uL    Mono # 0.3 0.3 - 1.0 K/uL    Eos # 0.0 0.0 - 0.5 K/uL    Baso # 0.01 0.00 - 0.20 K/uL    nRBC 0 0 /100 WBC    Gran% 89.9 (H) 38.0 - 73.0 %    Lymph% 6.7 (L) 18.0 - 48.0 %    Mono% 2.5 (L) 4.0 - 15.0 %    Eosinophil% 0.0 0.0 - 8.0 %    Basophil% 0.1 0.0 - 1.9 %    Differential Method Automated    CBC auto differential   Result Value Ref Range    WBC 11.20 3.90 - 12.70 K/uL    RBC 2.78 (L) 4.60 - 6.20 M/uL    Hemoglobin 8.6 (L) 14.0 - 18.0 g/dL    Hematocrit 26.7 (L) 40.0 - 54.0 %    Mean Corpuscular Volume 96 82 - 98 fL    Mean Corpuscular Hemoglobin 30.9 27.0 - 31.0 pg    Mean Corpuscular Hemoglobin Conc 32.2 32.0 - 36.0 g/dL    RDW 15.2 (H) 11.5 - 14.5 %    Platelets 149 (L) 150 - 350 K/uL    MPV 10.4 9.2 - 12.9 fL    Immature Granulocytes 0.7 (H) 0.0 - 0.5 %    Gran # (ANC) 10.0 (H) 1.8 - 7.7 K/uL    Immature Grans (Abs) 0.08 (H) 0.00 - 0.04 K/uL    Lymph # 0.8 (L) 1.0 - 4.8 K/uL    Mono # 0.4 0.3 - 1.0 K/uL    Eos # 0.0 0.0 - 0.5 K/uL    Baso # 0.01 0.00 - 0.20 K/uL    nRBC 0 0 /100 WBC    Gran% 89.0 (H) 38.0 - 73.0 %    Lymph% 6.9 (L) 18.0 - 48.0 %    Mono% 3.3 (L) 4.0 - 15.0 %    Eosinophil% 0.0 0.0 - 8.0 %    Basophil% 0.1 0.0 - 1.9 %    Differential Method Automated    CBC auto differential   Result Value Ref Range    WBC 9.04 3.90 - 12.70 K/uL    RBC 2.68 (L) 4.60 - 6.20 M/uL    Hemoglobin 8.4 (L) 14.0 - 18.0 g/dL    Hematocrit 25.4 (L) 40.0 - 54.0 %    Mean Corpuscular Volume 95 82 - 98 fL    Mean Corpuscular Hemoglobin 31.3 (H) 27.0 - 31.0 pg    Mean Corpuscular Hemoglobin Conc 33.1 32.0 - 36.0 g/dL    RDW 15.1 (H) 11.5 - 14.5 %    Platelets 150 150 - 350 K/uL    MPV  11.1 9.2 - 12.9 fL    Immature Granulocytes 0.6 (H) 0.0 - 0.5 %    Gran # (ANC) 7.3 1.8 - 7.7 K/uL    Immature Grans (Abs) 0.05 (H) 0.00 - 0.04 K/uL    Lymph # 1.0 1.0 - 4.8 K/uL    Mono # 0.6 0.3 - 1.0 K/uL    Eos # 0.0 0.0 - 0.5 K/uL    Baso # 0.02 0.00 - 0.20 K/uL    nRBC 0 0 /100 WBC    Gran% 80.5 (H) 38.0 - 73.0 %    Lymph% 11.4 (L) 18.0 - 48.0 %    Mono% 6.9 4.0 - 15.0 %    Eosinophil% 0.4 0.0 - 8.0 %    Basophil% 0.2 0.0 - 1.9 %    Differential Method Automated    CBC auto differential   Result Value Ref Range    WBC 6.76 3.90 - 12.70 K/uL    RBC 2.63 (L) 4.60 - 6.20 M/uL    Hemoglobin 8.1 (L) 14.0 - 18.0 g/dL    Hematocrit 25.0 (L) 40.0 - 54.0 %    Mean Corpuscular Volume 95 82 - 98 fL    Mean Corpuscular Hemoglobin 30.8 27.0 - 31.0 pg    Mean Corpuscular Hemoglobin Conc 32.4 32.0 - 36.0 g/dL    RDW 15.4 (H) 11.5 - 14.5 %    Platelets 137 (L) 150 - 350 K/uL    MPV 11.3 9.2 - 12.9 fL    Immature Granulocytes 0.7 (H) 0.0 - 0.5 %    Gran # (ANC) 4.6 1.8 - 7.7 K/uL    Immature Grans (Abs) 0.05 (H) 0.00 - 0.04 K/uL    Lymph # 1.5 1.0 - 4.8 K/uL    Mono # 0.4 0.3 - 1.0 K/uL    Eos # 0.2 0.0 - 0.5 K/uL    Baso # 0.02 0.00 - 0.20 K/uL    nRBC 0 0 /100 WBC    Gran% 67.3 38.0 - 73.0 %    Lymph% 22.8 18.0 - 48.0 %    Mono% 6.2 4.0 - 15.0 %    Eosinophil% 2.7 0.0 - 8.0 %    Basophil% 0.3 0.0 - 1.9 %    Differential Method Automated    Basic metabolic panel   Result Value Ref Range    Sodium 143 136 - 145 mmol/L    Potassium 3.8 3.5 - 5.1 mmol/L    Chloride 112 (H) 95 - 110 mmol/L    CO2 25 23 - 29 mmol/L    Glucose 100 70 - 110 mg/dL    BUN, Bld 13 6 - 20 mg/dL    Creatinine 0.8 0.5 - 1.4 mg/dL    Calcium 7.9 (L) 8.7 - 10.5 mg/dL    Anion Gap 6 (L) 8 - 16 mmol/L    eGFR if African American >60 >60 mL/min/1.73 m^2    eGFR if non African American >60 >60 mL/min/1.73 m^2   Magnesium   Result Value Ref Range    Magnesium 2.1 1.6 - 2.6 mg/dL   Phosphorus   Result Value Ref Range    Phosphorus 1.9 (L) 2.7 - 4.5 mg/dL   CBC  auto differential   Result Value Ref Range    WBC 6.76 3.90 - 12.70 K/uL    RBC 2.63 (L) 4.60 - 6.20 M/uL    Hemoglobin 8.1 (L) 14.0 - 18.0 g/dL    Hematocrit 25.0 (L) 40.0 - 54.0 %    Mean Corpuscular Volume 95 82 - 98 fL    Mean Corpuscular Hemoglobin 30.8 27.0 - 31.0 pg    Mean Corpuscular Hemoglobin Conc 32.4 32.0 - 36.0 g/dL    RDW 15.4 (H) 11.5 - 14.5 %    Platelets 137 (L) 150 - 350 K/uL    MPV 11.3 9.2 - 12.9 fL    Immature Granulocytes 0.7 (H) 0.0 - 0.5 %    Gran # (ANC) 4.6 1.8 - 7.7 K/uL    Immature Grans (Abs) 0.05 (H) 0.00 - 0.04 K/uL    Lymph # 1.5 1.0 - 4.8 K/uL    Mono # 0.4 0.3 - 1.0 K/uL    Eos # 0.2 0.0 - 0.5 K/uL    Baso # 0.02 0.00 - 0.20 K/uL    nRBC 0 0 /100 WBC    Gran% 67.3 38.0 - 73.0 %    Lymph% 22.8 18.0 - 48.0 %    Mono% 6.2 4.0 - 15.0 %    Eosinophil% 2.7 0.0 - 8.0 %    Basophil% 0.3 0.0 - 1.9 %    Differential Method Automated    Procalcitonin   Result Value Ref Range    Procalcitonin 2.66 (H) <0.25 ng/mL   Hemoglobin   Result Value Ref Range    Hemoglobin 8.6 (L) 14.0 - 18.0 g/dL   Hematocrit   Result Value Ref Range    Hematocrit 26.3 (L) 40.0 - 54.0 %   Type & Screen   Result Value Ref Range    Group & Rh A POS     Indirect Akash NEG    Prepare RBC 2 Units; transfusion   Result Value Ref Range    UNIT NUMBER [id number]     Product Code [id number]     DISPENSE STATUS TRANSFUSED     CODING SYSTEM [id number]     Unit Blood Type Code 6200     Unit Blood Type A POS     Unit Expiration [id number]     UNIT NUMBER [id number]     Product Code [id number]     DISPENSE STATUS TRANSFUSED     CODING SYSTEM KODU001     Unit Blood Type Code 6200     Unit Blood Type A POS     Unit Expiration [id number]          Imaging Results:  Imaging Results          CTA Abdomen and Pelvis (Final result)  Result time 03/14/20 08:29:41   Procedure changed from CTA Abdomen     Final result by Pascual Landeros MD (03/14/20 08:29:41)                 Impression:      1. Normal abdominal vasculature as  detailed above.  2. Fatty liver.  3. Small bilateral renal cysts.  4. Mild haziness left paramidline mesenteric fat which could represent mild panniculitis.  All CT scans at this facility are performed  using dose modulation techniques as appropriate to performed exam including the following:  automated exposure control; adjustment of mA and/or kV according to the patients size (this includes techniques or standardized protocols for targeted exams where dose is matched to indication/reason for exam: i.e. extremities or head);  iterative reconstruction technique.      Electronically signed by: Pascual Landeros  Date:    03/14/2020  Time:    08:29             Narrative:    EXAMINATION:  CTA ABDOMEN AND PELVIS    CLINICAL HISTORY:  Melena; gastrointestinal hemorrhage.    TECHNIQUE:  Standard thin-section axial images obtained with and without IV contrast.  2D reformatted images obtained.  Additional 3D   reconstructed images obtained, reviewed and archived.    COMPARISON:  None    FINDINGS:  The abdominal aorta is normal.  Iliac vasculature normal.  Celiac artery, SMA, renal arteries and REBEKAH are all patent and normal.    Diffuse fatty infiltration of the liver.  Gallbladder normal.  Portal vein patent.  The spleen is normal.  Pancreas normal.  Adrenal glands normal.  IVC normal.  No retroperitoneal adenopathy.    Small cyst mid right kidney measures 1.3 cm.  Two punctate subcentimeter cyst left kidney.  Ureters normal in caliber.    Stomach and small intestine are normal.  Appendix not visualized.  Colon is normal.  The rectum is normal.  Mild non-specific hazy increased density identified within the left paramidline mesenteric fat (axial series 3 images 58-86).  Findings could indicate mesenteric panniculitis.  No enlarged mesenteric lymph nodes to suggest adenitis.    Bladder normal.  Prostate normal.    Regional bones demonstrate no suspicious bony abnormality.  Abdominal wall soft tissues demonstrate a small  umbilical hernia containing fat only.                             3:33 AM: Per STAT radiology, pt's CT abdomen and pelvis results:   1. Normal arteries of the abdomen and pelvis.  2. Hepatic steatosis  3. Mild central mesenteric fat stranding, correlate with mesenteric panniculitis.      The EKG was ordered, reviewed, and independently interpreted by the ED provider.  Interpretation time: 0924  Rate: 95 BPM  Rhythm: normal sinus rhythm  Interpretation: No acute ST changes. No STEMI.           The Emergency Provider reviewed the vital signs and test results, which are outlined above.     ED Discussion     2:00 AM: Dr. Saucedo transfers care of pt to Dr. Baker pending lab/imaging results.    3:51 AM: Discussed case with Dr. Alexis ([facility]). Dr. Alexis agrees with current care and management of pt and accepts admission.   Admitting Service: \[facility]\"" medicine  Admitting Physician: Dr. Alexis  Admit to: OBS/TELE    3:52 AM: Re-evaluated pt. I have discussed test results, shared treatment plan, and the need for admission with patient and family at bedside. Pt and family express understanding at this time and agree with all information. All questions answered. Pt and family have no further questions or concerns at this time. Pt is ready for admit.             Medical Decision Making:   Clinical Tests:   Lab Tests: Ordered and Reviewed  Radiological Study: Reviewed and Ordered  Medical Tests: Reviewed and Ordered           ED Medication(s):  Medications   0.9%  NaCl infusion ( Intravenous Rate/Dose Change 3/14/20 2345)   sodium chloride 0.9% bolus 1,000 mL (0 mLs Intravenous Stopped 3/14/20 0252)   pantoprazole injection 40 mg (40 mg Intravenous Given 3/14/20 0053)   iohexoL (OMNIPAQUE 350) injection 100 mL (100 mLs Intravenous Given 3/14/20 0241)   barium sulfate (VOLUMEN) suspension 1,350 mL (1,350 mLs Oral Given 3/14/20 0100)   sodium chloride 0.9% bolus 1,000 mL (1,000 mLs Intravenous New Bag 3/14/20 0618)        Discharge Medication List as of 3/15/2020  3:00 PM      START taking these medications    Details   ferrous gluconate (FERGON) 324 MG tablet Take 1 tablet (324 mg total) by mouth 2 (two) times daily with meals., Starting Sun 3/15/2020, Until Tue 4/14/2020, Print      pantoprazole (PROTONIX) 40 MG tablet One po bid x 2 weeks , then one po daily for maintenance . Take 30 min before meal, Print             Follow-up Information     Primary Doctor No. Schedule an appointment as soon as possible for a visit in 1 week.    Why:  Have pt sees his PCP in a week and have repeat labs to check H&H            Lenka Prather MD. Schedule an appointment as soon as possible for a visit in 3 weeks.    Specialty:  Gastroenterology  Why:  Discharge follow up   Contact information:  [address] [place] [address] DR Sarah QUARLES [id number]  [phone]                       Scribe Attestation:   Scribe #1: I performed the above scribed service and the documentation accurately describes the services I performed. I attest to the accuracy of the note.     Attending:   Physician Attestation Statement for Scribe #1: I, Ginette Saucedo MD, personally performed the services described in this documentation, as scribed by Cara Pritchard, in my presence, and it is both accurate and complete.       Scribe Attestation:   Scribe #2: I performed the above scribed service and the documentation accurately describes the services I performed. I attest to the accuracy of the note.    Attending Attestation:           Physician Attestation for Scribe:    Physician Attestation Statement for Scribe #2: I, Kevin Baker MD, reviewed documentation, as scribed by Mary Pearl in my presence, and it is both accurate and complete. I also acknowledge and confirm the content of the note done by Ryderibbelén #1.           Clinical Impression       ICD-10-CM ICD-9-CM   1. Upper GI bleed K92.2 578.9   2. Weakness R53.1 780.79       Disposition:   Disposition:  Placed in Observation  Condition: Mary Ann Baker MD  03/16/20 2023

## 2020-03-14 NOTE — ASSESSMENT & PLAN NOTE
-Transfuse 2 units PRBC.   -Monitor hemoglobin and hematocrit.   -IV BID Protonix.   -GI consult.   -Patient counseled on cessation of BC powder use.

## 2020-03-14 NOTE — HOSPITAL COURSE
3/14- Pt is seen at bedside . Reports one bowel movement since came to the floor. Not sure stool color. C/O some nausea and upper abdominal discomfort before came to the hospital . Denies abdominal cramp or vomiting . BP is marginal . Mild tachycardia is again noted . Tmax 100.4 likely related to blood transfusion. S/P 1 unit of P-RBC completed early this morning . Repeat H&H 7.8/24.8 which is unchanged from admit H&H. Possibly due to active bleed. GI consult obtained . Plan to transfuse 2nd unit of  P-RBC . Continue PPI infusion and EGD today .   3/15- Pt remains stable with improved MAP . Underwent EGD yesterday revealed two non-obstructing non-bleeding gastric ulcers with no stigmata of   bleeding in the prepyloric region of the stomach. The two ulcers were clean and linear. No active bleeding was seen but surrounding erythema was seen. Last night pt had bowel movement with dark stool but no further melena reported . Repeat H&H stayed stable at 8.5&26. CTA of abd /pelvis ordered by GI service resulted normal abdominal vasculature and no signs of active bleeding . At this point GI service cleared pt to discharge home with oral PPI . Stool obtained for H.pylori antigen per GI recommendation and result was pending at the time of discharge . Pt is advised to see his Primary Care MD in a week and Dr. Prather (GI) for follow up in 2 to 4 weeks. Pt will need out patient screening colonoscopy and will be arranged per GI clinic. Pt was examined before discharge and found stable and suitable for discharge to home .

## 2020-03-14 NOTE — ANESTHESIA POSTPROCEDURE EVALUATION
Anesthesia Post Evaluation    Patient: Jelani Whaley    Procedure(s) Performed: Procedure(s) (LRB):  EGD (ESOPHAGOGASTRODUODENOSCOPY) (N/A)    Final Anesthesia Type: MAC    Patient location during evaluation: OPS  Patient participation: Yes- Able to Participate  Level of consciousness: awake and alert  Post-procedure vital signs: reviewed and stable  Pain management: adequate  Airway patency: patent    PONV status at discharge: No PONV      Cardiovascular status: blood pressure returned to baseline  Respiratory status: unassisted  Hydration status: euvolemic            Vitals Value Taken Time   /67 3/14/2020  2:19 PM   Temp 38.6 °C (101.5 °F) 3/14/2020  2:10 PM   Pulse 100 3/14/2020  2:19 PM   Resp 18 3/14/2020  1:44 PM   SpO2 99 % 3/14/2020  2:19 PM         No case tracking events are documented in the log.      Pain/Brian Score: No data recorded

## 2020-03-14 NOTE — NURSING
Per Dr. Mclean, do not give the second unit of blood yet. Rechecking H&H and will determine next action per results.

## 2020-03-15 VITALS
WEIGHT: 251.56 LBS | HEIGHT: 66 IN | HEART RATE: 82 BPM | RESPIRATION RATE: 18 BRPM | OXYGEN SATURATION: 100 % | SYSTOLIC BLOOD PRESSURE: 132 MMHG | DIASTOLIC BLOOD PRESSURE: 83 MMHG | BODY MASS INDEX: 40.43 KG/M2 | TEMPERATURE: 98 F

## 2020-03-15 PROBLEM — K92.2 UPPER GI BLEED: Status: RESOLVED | Noted: 2020-03-14 | Resolved: 2020-03-15

## 2020-03-15 PROBLEM — D64.9 SYMPTOMATIC ANEMIA: Status: RESOLVED | Noted: 2020-03-14 | Resolved: 2020-03-15

## 2020-03-15 LAB
ANION GAP SERPL CALC-SCNC: 6 MMOL/L (ref 8–16)
BASOPHILS # BLD AUTO: 0.02 K/UL (ref 0–0.2)
BASOPHILS # BLD AUTO: 0.02 K/UL (ref 0–0.2)
BASOPHILS NFR BLD: 0.3 % (ref 0–1.9)
BASOPHILS NFR BLD: 0.3 % (ref 0–1.9)
BUN SERPL-MCNC: 13 MG/DL (ref 6–20)
CALCIUM SERPL-MCNC: 7.9 MG/DL (ref 8.7–10.5)
CHLORIDE SERPL-SCNC: 112 MMOL/L (ref 95–110)
CO2 SERPL-SCNC: 25 MMOL/L (ref 23–29)
CREAT SERPL-MCNC: 0.8 MG/DL (ref 0.5–1.4)
DIFFERENTIAL METHOD: ABNORMAL
DIFFERENTIAL METHOD: ABNORMAL
EOSINOPHIL # BLD AUTO: 0.2 K/UL (ref 0–0.5)
EOSINOPHIL # BLD AUTO: 0.2 K/UL (ref 0–0.5)
EOSINOPHIL NFR BLD: 2.7 % (ref 0–8)
EOSINOPHIL NFR BLD: 2.7 % (ref 0–8)
ERYTHROCYTE [DISTWIDTH] IN BLOOD BY AUTOMATED COUNT: 15.4 % (ref 11.5–14.5)
ERYTHROCYTE [DISTWIDTH] IN BLOOD BY AUTOMATED COUNT: 15.4 % (ref 11.5–14.5)
EST. GFR  (AFRICAN AMERICAN): >60 ML/MIN/1.73 M^2
EST. GFR  (NON AFRICAN AMERICAN): >60 ML/MIN/1.73 M^2
GLUCOSE SERPL-MCNC: 100 MG/DL (ref 70–110)
HCT VFR BLD AUTO: 25 % (ref 40–54)
HCT VFR BLD AUTO: 25 % (ref 40–54)
HCT VFR BLD AUTO: 26.3 % (ref 40–54)
HGB BLD-MCNC: 8.1 G/DL (ref 14–18)
HGB BLD-MCNC: 8.1 G/DL (ref 14–18)
HGB BLD-MCNC: 8.6 G/DL (ref 14–18)
IMM GRANULOCYTES # BLD AUTO: 0.05 K/UL (ref 0–0.04)
IMM GRANULOCYTES # BLD AUTO: 0.05 K/UL (ref 0–0.04)
IMM GRANULOCYTES NFR BLD AUTO: 0.7 % (ref 0–0.5)
IMM GRANULOCYTES NFR BLD AUTO: 0.7 % (ref 0–0.5)
LYMPHOCYTES # BLD AUTO: 1.5 K/UL (ref 1–4.8)
LYMPHOCYTES # BLD AUTO: 1.5 K/UL (ref 1–4.8)
LYMPHOCYTES NFR BLD: 22.8 % (ref 18–48)
LYMPHOCYTES NFR BLD: 22.8 % (ref 18–48)
MAGNESIUM SERPL-MCNC: 2.1 MG/DL (ref 1.6–2.6)
MCH RBC QN AUTO: 30.8 PG (ref 27–31)
MCH RBC QN AUTO: 30.8 PG (ref 27–31)
MCHC RBC AUTO-ENTMCNC: 32.4 G/DL (ref 32–36)
MCHC RBC AUTO-ENTMCNC: 32.4 G/DL (ref 32–36)
MCV RBC AUTO: 95 FL (ref 82–98)
MCV RBC AUTO: 95 FL (ref 82–98)
MONOCYTES # BLD AUTO: 0.4 K/UL (ref 0.3–1)
MONOCYTES # BLD AUTO: 0.4 K/UL (ref 0.3–1)
MONOCYTES NFR BLD: 6.2 % (ref 4–15)
MONOCYTES NFR BLD: 6.2 % (ref 4–15)
NEUTROPHILS # BLD AUTO: 4.6 K/UL (ref 1.8–7.7)
NEUTROPHILS # BLD AUTO: 4.6 K/UL (ref 1.8–7.7)
NEUTROPHILS NFR BLD: 67.3 % (ref 38–73)
NEUTROPHILS NFR BLD: 67.3 % (ref 38–73)
NRBC BLD-RTO: 0 /100 WBC
NRBC BLD-RTO: 0 /100 WBC
PHOSPHATE SERPL-MCNC: 1.9 MG/DL (ref 2.7–4.5)
PLATELET # BLD AUTO: 137 K/UL (ref 150–350)
PLATELET # BLD AUTO: 137 K/UL (ref 150–350)
PMV BLD AUTO: 11.3 FL (ref 9.2–12.9)
PMV BLD AUTO: 11.3 FL (ref 9.2–12.9)
POTASSIUM SERPL-SCNC: 3.8 MMOL/L (ref 3.5–5.1)
PROCALCITONIN SERPL IA-MCNC: 2.66 NG/ML
RBC # BLD AUTO: 2.63 M/UL (ref 4.6–6.2)
RBC # BLD AUTO: 2.63 M/UL (ref 4.6–6.2)
SODIUM SERPL-SCNC: 143 MMOL/L (ref 136–145)
WBC # BLD AUTO: 6.76 K/UL (ref 3.9–12.7)
WBC # BLD AUTO: 6.76 K/UL (ref 3.9–12.7)

## 2020-03-15 PROCEDURE — 84145 PROCALCITONIN (PCT): CPT

## 2020-03-15 PROCEDURE — 84100 ASSAY OF PHOSPHORUS: CPT

## 2020-03-15 PROCEDURE — 83735 ASSAY OF MAGNESIUM: CPT

## 2020-03-15 PROCEDURE — 25000003 PHARM REV CODE 250: Performed by: INTERNAL MEDICINE

## 2020-03-15 PROCEDURE — 80048 BASIC METABOLIC PNL TOTAL CA: CPT

## 2020-03-15 PROCEDURE — 85018 HEMOGLOBIN: CPT

## 2020-03-15 PROCEDURE — 36415 COLL VENOUS BLD VENIPUNCTURE: CPT

## 2020-03-15 PROCEDURE — 85025 COMPLETE CBC W/AUTO DIFF WBC: CPT

## 2020-03-15 PROCEDURE — 85014 HEMATOCRIT: CPT

## 2020-03-15 RX ORDER — PANTOPRAZOLE SODIUM 40 MG/1
TABLET, DELAYED RELEASE ORAL
Qty: 44 TABLET | Refills: 0 | Status: SHIPPED | OUTPATIENT
Start: 2020-03-15

## 2020-03-15 RX ORDER — AMLODIPINE BESYLATE 5 MG/1
TABLET ORAL
Qty: 30 TABLET | Refills: 0 | Status: SHIPPED | OUTPATIENT
Start: 2020-03-15

## 2020-03-15 RX ORDER — FERROUS GLUCONATE 324(38)MG
324 TABLET ORAL 2 TIMES DAILY WITH MEALS
Qty: 60 TABLET | Refills: 0 | Status: SHIPPED | OUTPATIENT
Start: 2020-03-15 | End: 2020-04-14

## 2020-03-15 RX ORDER — ZOLPIDEM TARTRATE 5 MG/1
5 TABLET ORAL NIGHTLY PRN
Status: DISCONTINUED | OUTPATIENT
Start: 2020-03-15 | End: 2020-03-15 | Stop reason: HOSPADM

## 2020-03-15 RX ADMIN — PANTOPRAZOLE SODIUM 40 MG: 40 TABLET, DELAYED RELEASE ORAL at 08:03

## 2020-03-15 NOTE — NURSING
Went over discharge instructions with patient.   Stressed importance of making and keeping all follow ups.   Patient verbalized understanding and has no questions in regards to discharge.  IV removed, catheter intact.  Telemetry box removed.  Patient dressed and awaiting ride.

## 2020-03-15 NOTE — PROGRESS NOTES
[facility]  Gastroenterology  Progress Note    Patient Name: Jelani Whaley  MRN: [id number]  Admission Date: 3/13/2020  Hospital Length of Stay: 1 days  Code Status: Full Code   Attending Provider: Merlene Mclean MD  Consulting Provider: Lenka Prather MD  Primary Care Physician: Primary Doctor No  Principal Problem: Upper GI bleed    No new subjective & objective note has been filed under this hospital service since the last note was generated.    EGD showed two gastric antral clean based ulcers with no active bleeding and did not require endoscopic therapy. He had less melena and his vital signs remained stable. His abdominal pain has resolved.    Assessment/Plan:     No new Assessment & Plan notes have been filed under this hospital service since the last note was generated.  Service: Gastroenterology      Mr. Sultana is a 49-year-old male admitted with melena from most likely upper GI source with a recent history of taking NSAIDs and prednisone at half with him at risk for peptic ulcer disease.  EGD yesterday showed two clean based gastric antral ulcers with no active bleeding and required no endoscopic management. His abdominal pain improved.     Hemoglobin remains unchanged. Pt is tolerating diet.     Recommendations  Protonix 40 mg po bid  Recheck a h/h this afternoon and if ok can follow up as outpatient in 2-3 weeks  Check stool for H.pylori antigen  Avoid NSAIDS    Thank you for your consult. I will sign off. Please contact us if you have any additional questions.    Lenka Prather MD  Gastroenterology  [facility]

## 2020-03-18 LAB — H PYLORI AG STL QL IA: NOT DETECTED

## 2020-03-18 NOTE — DISCHARGE SUMMARY
[facility] Hospital Medicine  Discharge Summary      Patient Name: Jelani Whaley  MRN: [id number]  Admission Date: 3/13/2020  Hospital Length of Stay: 1 days  Discharge Date and Time:  03/17/2020 10:28 PM  Attending Physician: Vilma att. providers found   Discharging Provider: Merlene Mclean MD  Primary Care Provider: Primary Doctor Vilma      HPI:   Jelani Whaley is a 49 year old male with hypertension and hyperlipidemia who presented to the ED with reports of melena that began one day prior to presentation. The patient reports that he awoke during the night prior to presentation, had multiple black stools, which were initially formed, but became tarry. There was associated dizziness and possible syncope relieved by laying on the ground. The patient also notes associated palpitations and mild nausea two days prior to presentation. He denies abdominal pain, vomiting, bright red blood per rectum and prior history of GI bleeding. He reports taking 2-3 packets of BC powders daily and was recently prescribed steroids for an upper respiratory illness. In the ED, the patient was tachycardic and hypotensive. His hemoglobin was 8.1 with no prior measurement available. Other labs were significant for a BUN of 49 with a creatinine of 1, glucose of 195, albumin of 3.2 and total protein of 5.5. CTA of the abdomen is pending.     Procedure(s) (LRB):  EGD (ESOPHAGOGASTRODUODENOSCOPY) (N/A)      Hospital Course:   3/14- Pt is seen at bedside . Reports one bowel movement since came to the floor. Not sure stool color. C/O some nausea and upper abdominal discomfort before came to the hospital . Denies abdominal cramp or vomiting . BP is marginal . Mild tachycardia is again noted . Tmax 100.4 likely related to blood transfusion. S/P 1 unit of P-RBC completed early this morning . Repeat H&H 7.8/24.8 which is unchanged from admit H&H. Possibly due to active bleed. GI consult obtained . Plan to transfuse 2nd unit of   P-RBC . Continue PPI infusion and EGD today .   3/15- Pt remains stable with improved MAP . Underwent EGD yesterday revealed two non-obstructing non-bleeding gastric ulcers with no stigmata of   bleeding in the prepyloric region of the stomach. The two ulcers were clean and linear. No active bleeding was seen but surrounding erythema was seen. Last night pt had bowel movement with dark stool but no further melena reported . Repeat H&H stayed stable at 8.5&26. CTA of abd /pelvis ordered by GI service resulted normal abdominal vasculature and no signs of active bleeding . At this point GI service cleared pt to discharge home with oral PPI . Stool obtained for H.pylori antigen per GI recommendation and result was pending at the time of discharge . Pt is advised to see his Primary Care MD in a week and Dr. Prather (GI) for follow up in 2 to 4 weeks. Pt will need out patient screening colonoscopy and will be arranged per GI clinic. Pt was examined before discharge and found stable and suitable for discharge to home .      Consults:     No new Assessment & Plan notes have been filed under this hospital service since the last note was generated.  Service: Hospital Medicine    Final Active Diagnoses:    Diagnosis Date Noted POA    Hypertension [I10] 03/12/2020 Yes    Hyperlipidemia [E78.5] 03/12/2020 Yes      Problems Resolved During this Admission:    Diagnosis Date Noted Date Resolved POA    PRINCIPAL PROBLEM:  Upper GI bleed [K92.2] 03/14/2020 03/15/2020 Yes    Symptomatic anemia [D64.9] 03/14/2020 03/15/2020 Yes       Discharged Condition: stable    Disposition: Home or Self Care    Follow Up:  Follow-up Information     Primary Doctor No. Schedule an appointment as soon as possible for a visit in 1 week.    Why:  Have pt sees his PCP in a week and have repeat labs to check H&H            Lenka Prather MD. Schedule an appointment as soon as possible for a visit in 3 weeks.    Specialty:   Gastroenterology  Why:  Discharge follow up   Contact information:  [id number] [place] [id number]  [phone]                 Patient Instructions:      Diet Adult Regular     Activity as tolerated       Significant Diagnostic Studies: Labs: BMP: No results for input(s): GLU, NA, K, CL, CO2, BUN, CREATININE, CALCIUM, MG in the last 48 hours., CMP No results for input(s): NA, K, CL, CO2, GLU, BUN, CREATININE, CALCIUM, PROT, ALBUMIN, BILITOT, ALKPHOS, AST, ALT, ANIONGAP, ESTGFRAFRICA, EGFRNONAA in the last 48 hours. and CBC No results for input(s): WBC, HGB, HCT, PLT in the last 48 hours.    Pending Diagnostic Studies:     Procedure Component Value Units Date/Time    H. pylori antigen, stool [[id number]] Collected:  03/14/20 1637    Order Status:  Sent Lab Status:  In process Updated:  03/14/20 1936    Specimen:  Stool          Medications:  Reconciled Home Medications:      Medication List      START taking these medications    ferrous gluconate 324 MG tablet  Commonly known as:  FERGON  Take 1 tablet (324 mg total) by mouth 2 (two) times daily with meals.     pantoprazole 40 MG tablet  Commonly known as:  PROTONIX  One po bid x 2 weeks , then one po daily for maintenance . Take 30 min before meal        CHANGE how you take these medications    amLODIPine 5 MG tablet  Commonly known as:  NORVASC  HOLD UNTIL  BLOOD PRESSURE IS ABOVE 130/80  What changed:    · how much to take  · how to take this  · when to take this  · additional instructions  · Another medication with the same name was removed. Continue taking this medication, and follow the directions you see here.     LIPITOR ORAL  Take by mouth.  What changed:  Another medication with the same name was removed. Continue taking this medication, and follow the directions you see here.        CONTINUE taking these medications    fluticasone propionate 50 mcg/actuation nasal spray  Commonly known as:  FLONASE  1 spray by Each Nostril route once  daily.     ZyrTEC 10 MG tablet  Generic drug:  cetirizine  Take 10 mg by mouth once daily.        STOP taking these medications    BC HEADACHE POWDER ORAL     CITALOPRAM ORAL     diphenhydrAMINE 25 mg tablet  Commonly known as:  SOMINEX     peg-electrolyte soln 420 gram Solr  Commonly known as:  NULYTELY WITH FLAVOR PACKS     predniSONE 20 MG tablet  Commonly known as:  DELTASONE            Indwelling Lines/Drains at time of discharge:   Lines/Drains/Airways     None                 Time spent on the discharge of patient:  40  minutes  Patient was seen and examined on the date of discharge and determined to be suitable for discharge.         Merlene Mclean MD  Department of Hospital Medicine  [facility]

## 2020-03-19 NOTE — PHYSICIAN QUERY
PT Name: Jelani Whaley  MR #: [id number]     PHYSICIAN QUERY -  ACUITY OF CONDITION CLARIFICATION      CDS/: Zeynep Cortés RN, CDS               Contact information: [email or web address]  This form is a permanent document in the medical record.     Query Date: March 18, 2020    By submitting this query, we are merely seeking further clarification of documentation to reflect the severity of illness of your patient. Please utilize your independent clinical judgment when addressing the question(s) below.    The Medical record reflects the following:     Indicators   Supporting Clinical Findings Location in Medical Record   x Documentation of condition two non-obstructing non-bleeding gastric ulcers  DC Summary 3/15    Lab Value(s)     x Radiology Findings Two non-obstructing non-bleeding gastric ulcers with no stigmata of bleeding were found in the prepyloric region of the stomach. The   largest lesion was 6 mm in largest dimension. There is no evidence of perforation. The two ulcers were clean and linear. No active bleeding was seen. Surrounding erythema was seen.   EGD Report 3/14   x Treatment                                 Medication Continue PPI infusion    discharge home with oral PPI    DC Summary 3/15    DC Summary 3/15   x Other presented to the ED with reports of melena that began one day prior to presentation. The patient reports that he awoke during the night prior to presentation, had multiple black stools, which were initially formed, but became tarry. There was associated dizziness and possible syncope relieved by laying on the ground. The patient also notes associated palpitations and mild nausea two days prior to presentation. He denies abdominal pain, vomiting, bright red blood per rectum and prior history of GI bleeding. He reports taking 2-3 packets of BC powders daily  DC Summary 3/15     Provider, please specify the acuity/chronicity of __gastric ulcers_:    [ x  ] Acute   [   ] Acute  and/on chronic   [   ] Other, please specify _______________   [   ] Clinically Undetermined       Please document in your progress notes daily for the duration of treatment until resolved, and include in your discharge summary.

## 2020-03-19 NOTE — PHYSICIAN QUERY
"PT Name: Jelani Whaley  MR #: [id number]    Physician Query Form - Hematology Clarification      CDS/: Zeynep Cortés RN, CDS               Contact information: [email or web address]      This form is a permanent document in the medical record.      Query Date: March 18, 2020    By submitting this query, we are merely seeking further clarification of documentation. Please utilize your independent clinical judgment when addressing the question(s) below.    The Medical record contains the following:   Indicators  Supporting Clinical Findings Location in Medical Record   x "Anemia" documented Symptomatic anemia  -Transfuse 2 units PRBC.   -Monitor hemoglobin and hematocrit.    H&P 3/14   x H & H = 8.1/24.8 --> 7.8/24.8 --> 8.6/26.7   Labs 3/13- 3/14   x BP =                     HR= BP: ()/(52-70) 125/64  Pulse:  [] 110   H&P 3/14  H&P 3/14   x "GI bleeding" documented Assessment/Plan:  Upper GI bleed   H&P 3/14    Acute bleeding (Non GI site)     x Transfusion(s) Transfuse 2 units PRBC H&P 3/14    Treatment:     x Other:  Patient presents with   Melena  S/O 24hr ago; Pt. states that stool is tarry/dark black. Estimates 5-6 BMs last night and 1 BM today with blood. Pt. pale.   Weakness   Loss of Consciousness  Pt. reports multiple syncopal episodes between last night and today with standing/walking. Denies hitting head in syncopal episodes.     H&P 3/14     Provider, please specify diagnosis or diagnoses associated with above clinical findings.    [x  ] Acute blood loss anemia     [  ] Other Hematological Diagnosis (please specify):     [  ] Clinically Undetermined       Please document in your progress notes daily for the duration of treatment, until resolved, and include in your discharge summary.                                                                                                      "

## 2020-04-08 ENCOUNTER — TELEPHONE (OUTPATIENT)
Dept: ENDOSCOPY | Facility: HOSPITAL | Age: 50
End: 2020-04-08

## 2020-04-08 NOTE — TELEPHONE ENCOUNTER
Spoke with patient regarding cancellation of procedure with Dr. Saenz on 4/20/2020 due to Covid-19. Patient verbalized understanding of the rescheduling process.

## 2020-04-20 ENCOUNTER — PATIENT MESSAGE (OUTPATIENT)
Dept: GASTROENTEROLOGY | Facility: CLINIC | Age: 50
End: 2020-04-20

## 2020-04-21 ENCOUNTER — TELEPHONE (OUTPATIENT)
Dept: GASTROENTEROLOGY | Facility: CLINIC | Age: 50
End: 2020-04-21

## 2020-04-21 NOTE — TELEPHONE ENCOUNTER
Spoke with pt. Pt says he seen portal message. Says he has to get [facility]. Once he does he will give a call back    ----- Message from Lenka Prather MD sent at 4/18/2020  9:29 PM CDT -----  Please schedule him for a Tele medicine visit with me this week or next

## 2020-05-22 ENCOUNTER — TELEPHONE (OUTPATIENT)
Dept: ENDOSCOPY | Facility: HOSPITAL | Age: 50
End: 2020-05-22

## 2020-05-22 DIAGNOSIS — Z12.11 SCREENING FOR COLON CANCER: Primary | ICD-10-CM

## 2020-05-22 NOTE — PRE ADMISSION SCREENING
Per Dr Prather pt needs EGD in addition to his colonoscopy scheduled on 6/19/2020.  EGD will be added to colon case request.

## 2020-05-22 NOTE — TELEPHONE ENCOUNTER
COVID Screening     1. Have you had a fever in the last 7 days or have you used fever reducing medicines for a fever in the last 7 days?  no    2. Are you experiencing shortness of breath, cough, muscle aches, loss of taste or loss of smell?  no    3. Are you residing with anyone who has tested positive for Covid?  no    If answered yes to any of the above questions, the pt must be scheduled for an appointment with their PCP.    A message also needs to be sent to the endoscopist to ensure the patient gets rescheduled at a later date.     ENDO screening    1. Have you been admitted overnight to the hospital in the past 3 months? yes   If yes, schedule an appointment with PCP before scheduling endoscopic procedure.     2. Have you had a stent placed in the last 12 months? no   If yes, for a screening visit, cancel and message the ordering provider.  The patient will need a new order when the time is appropriate.     3. Have you had a stroke or heart attack in the past 6 months? no   If yes, cancel and refer patient to ordering provider for clearance, also message ordering provider to inform.     4. Have you had any chest pain in the past 3 months? no   If yes, Have you been evaluated by your PCP and/or cardiologist and it was determined to not be heart related? not applicable   If No, Pt needs to be seen by PCP or Cardiologist .  Pt can be scheduled once clearance obtained by either of those providers.     5. Do you take prescription weight loss medications?  no   If yes, must stop for 2 weeks prior to procedure.     6. Have you been diagnosed with diverticulitis within the past 3 months? no   If yes, must have been seen by GI within the last 3 months, if not schedule with GI MARTIN.    If pt has been seen by GI, schedule procedure 8-12 weeks post antibiotic treatment.     7. Are you on Dialysis? no  If yes, schedule procedure for the day AFTER dialysis.  Appt time should be 9am or later, patient arrival time is 2 hours  "prior.  Nulytely or    miralax prep for all patients with kidney disease.     8. Are you diabetic?  no   If yes, schedule morning appt. Advise pt to hold all diabetic meds day of procedure.     9. If pt is older than 80 years of age and HAS NOT been seen by GI or PCP within the last 6 months, needs appt with GI MARTIN.   If pt has been seen by the GI provider or PCP within the past 6  months AND meets criteria, schedule procedure AND send message to the endoscopist.     10. Is patient on a "high risk" medication (blood thinner/antiplatelet agent)?  no   If yes, has cardiac clearance been obtained within the last 60 days? N/A   If no, a new clearance needs to be obtained.       I have reviewed the last colonoscopy for recommendations regarding next procedure bowel prep.  yes  I have reviewed medications and allergies.  yes  I have verified the pharmacy information and appropriate prep sent if needed. yes  Prep instructions have been mailed or sent to portal per patient request. yes    If answers yes to any of the following, schedule at [place] ONLY. If No, OK for either location.     Is BMI over 45?   Any complaints of chest pain, new onset or at rest?  Does pt have an AICD?  Is there a diagnosis of heart failure?  Does patient have an insulin pump?  If procedure for esophageal banding?      "

## 2020-06-17 ENCOUNTER — LAB VISIT (OUTPATIENT)
Dept: OTOLARYNGOLOGY | Facility: CLINIC | Age: 50
End: 2020-06-17
Payer: OTHER GOVERNMENT

## 2020-06-17 DIAGNOSIS — Z12.11 SCREENING FOR COLON CANCER: ICD-10-CM

## 2020-06-17 PROCEDURE — U0003 INFECTIOUS AGENT DETECTION BY NUCLEIC ACID (DNA OR RNA); SEVERE ACUTE RESPIRATORY SYNDROME CORONAVIRUS 2 (SARS-COV-2) (CORONAVIRUS DISEASE [COVID-19]), AMPLIFIED PROBE TECHNIQUE, MAKING USE OF HIGH THROUGHPUT TECHNOLOGIES AS DESCRIBED BY CMS-2020-01-R: HCPCS

## 2020-06-18 LAB — SARS-COV-2 RNA RESP QL NAA+PROBE: NOT DETECTED

## 2020-06-18 RX ORDER — SODIUM CHLORIDE 0.9 % (FLUSH) 0.9 %
10 SYRINGE (ML) INJECTION
Status: CANCELLED | OUTPATIENT
Start: 2020-06-19

## 2020-06-19 ENCOUNTER — HOSPITAL ENCOUNTER (OUTPATIENT)
Facility: HOSPITAL | Age: 50
Discharge: HOME | End: 2020-06-19
Attending: INTERNAL MEDICINE | Admitting: INTERNAL MEDICINE
Payer: COMMERCIAL

## 2020-06-19 ENCOUNTER — ANESTHESIA EVENT (OUTPATIENT)
Dept: ENDOSCOPY | Facility: HOSPITAL | Age: 50
End: 2020-06-19
Payer: COMMERCIAL

## 2020-06-19 ENCOUNTER — ANESTHESIA (OUTPATIENT)
Dept: ENDOSCOPY | Facility: HOSPITAL | Age: 50
End: 2020-06-19
Payer: COMMERCIAL

## 2020-06-19 VITALS
BODY MASS INDEX: 38.62 KG/M2 | WEIGHT: 240.31 LBS | SYSTOLIC BLOOD PRESSURE: 118 MMHG | HEIGHT: 66 IN | RESPIRATION RATE: 18 BRPM | OXYGEN SATURATION: 98 % | TEMPERATURE: 98 F | HEART RATE: 61 BPM | DIASTOLIC BLOOD PRESSURE: 65 MMHG

## 2020-06-19 DIAGNOSIS — Z12.11 COLON CANCER SCREENING: Primary | ICD-10-CM

## 2020-06-19 PROCEDURE — 27201028 HC NEEDLE, SCLERO: Performed by: INTERNAL MEDICINE

## 2020-06-19 PROCEDURE — 37000008 HC ANESTHESIA 1ST 15 MINUTES: Performed by: INTERNAL MEDICINE

## 2020-06-19 PROCEDURE — 00813 ANES UPR LWR GI NDSC PX: CPT | Performed by: INTERNAL MEDICINE

## 2020-06-19 PROCEDURE — 27201042 HC RETRIEVAL NET: Performed by: INTERNAL MEDICINE

## 2020-06-19 PROCEDURE — 45381 COLONOSCOPY SUBMUCOUS NJX: CPT | Mod: 51,,, | Performed by: INTERNAL MEDICINE

## 2020-06-19 PROCEDURE — 43239 PR EGD, FLEX, W/BIOPSY, SGL/MULTI: ICD-10-PCS | Mod: 51,,, | Performed by: INTERNAL MEDICINE

## 2020-06-19 PROCEDURE — 27200997: Performed by: INTERNAL MEDICINE

## 2020-06-19 PROCEDURE — 88305 TISSUE EXAM BY PATHOLOGIST: ICD-10-PCS | Mod: 26,,, | Performed by: PATHOLOGY

## 2020-06-19 PROCEDURE — 88305 TISSUE EXAM BY PATHOLOGIST: CPT | Mod: 59 | Performed by: PATHOLOGY

## 2020-06-19 PROCEDURE — 45385 COLONOSCOPY W/LESION REMOVAL: CPT | Performed by: INTERNAL MEDICINE

## 2020-06-19 PROCEDURE — 45385 COLONOSCOPY W/LESION REMOVAL: CPT | Mod: ,,, | Performed by: INTERNAL MEDICINE

## 2020-06-19 PROCEDURE — 88305 TISSUE EXAM BY PATHOLOGIST: CPT | Mod: 26,,, | Performed by: PATHOLOGY

## 2020-06-19 PROCEDURE — 63600175 PHARM REV CODE 636 W HCPCS: Performed by: NURSE ANESTHETIST, CERTIFIED REGISTERED

## 2020-06-19 PROCEDURE — 27201012 HC FORCEPS, HOT/COLD, DISP: Performed by: INTERNAL MEDICINE

## 2020-06-19 PROCEDURE — 45381 PR COLONOSCPY,FLEX,W/DIR SUBMUC INJECT: ICD-10-PCS | Mod: 51,,, | Performed by: INTERNAL MEDICINE

## 2020-06-19 PROCEDURE — 45381 COLONOSCOPY SUBMUCOUS NJX: CPT | Performed by: INTERNAL MEDICINE

## 2020-06-19 PROCEDURE — 25000003 PHARM REV CODE 250: Performed by: NURSE ANESTHETIST, CERTIFIED REGISTERED

## 2020-06-19 PROCEDURE — 27201089 HC SNARE, DISP (ANY): Performed by: INTERNAL MEDICINE

## 2020-06-19 PROCEDURE — 37000009 HC ANESTHESIA EA ADD 15 MINS: Performed by: INTERNAL MEDICINE

## 2020-06-19 PROCEDURE — 43239 EGD BIOPSY SINGLE/MULTIPLE: CPT | Performed by: INTERNAL MEDICINE

## 2020-06-19 PROCEDURE — 43239 EGD BIOPSY SINGLE/MULTIPLE: CPT | Mod: 51,,, | Performed by: INTERNAL MEDICINE

## 2020-06-19 PROCEDURE — 45385 PR COLONOSCOPY,REMV LESN,SNARE: ICD-10-PCS | Mod: ,,, | Performed by: INTERNAL MEDICINE

## 2020-06-19 RX ORDER — LIDOCAINE HYDROCHLORIDE 10 MG/ML
INJECTION, SOLUTION EPIDURAL; INFILTRATION; INTRACAUDAL; PERINEURAL
Status: DISCONTINUED | OUTPATIENT
Start: 2020-06-19 | End: 2020-06-19

## 2020-06-19 RX ORDER — PROPOFOL 10 MG/ML
VIAL (ML) INTRAVENOUS
Status: DISCONTINUED | OUTPATIENT
Start: 2020-06-19 | End: 2020-06-19

## 2020-06-19 RX ORDER — GLYCOPYRROLATE 0.2 MG/ML
INJECTION INTRAMUSCULAR; INTRAVENOUS
Status: DISCONTINUED | OUTPATIENT
Start: 2020-06-19 | End: 2020-06-19

## 2020-06-19 RX ORDER — SODIUM CHLORIDE, SODIUM LACTATE, POTASSIUM CHLORIDE, CALCIUM CHLORIDE 600; 310; 30; 20 MG/100ML; MG/100ML; MG/100ML; MG/100ML
INJECTION, SOLUTION INTRAVENOUS CONTINUOUS PRN
Status: DISCONTINUED | OUTPATIENT
Start: 2020-06-19 | End: 2020-06-19

## 2020-06-19 RX ADMIN — PROPOFOL 20 MG: 10 INJECTION, EMULSION INTRAVENOUS at 02:06

## 2020-06-19 RX ADMIN — LIDOCAINE HYDROCHLORIDE 50 MG: 10 INJECTION, SOLUTION EPIDURAL; INFILTRATION; INTRACAUDAL; PERINEURAL at 02:06

## 2020-06-19 RX ADMIN — PROPOFOL 20 MG: 10 INJECTION, EMULSION INTRAVENOUS at 03:06

## 2020-06-19 RX ADMIN — PROPOFOL 100 MG: 10 INJECTION, EMULSION INTRAVENOUS at 02:06

## 2020-06-19 RX ADMIN — SODIUM CHLORIDE, SODIUM LACTATE, POTASSIUM CHLORIDE, AND CALCIUM CHLORIDE: .6; .31; .03; .02 INJECTION, SOLUTION INTRAVENOUS at 02:06

## 2020-06-19 RX ADMIN — GLYCOPYRROLATE 0.2 MG: 0.2 INJECTION INTRAMUSCULAR; INTRAVENOUS at 02:06

## 2020-06-19 NOTE — [employer] PATIENT INSTRUCTIONS
Discharge Summary/Instructions after an Endoscopic Procedure  Patient Name: Jelani Diez  Patient MRN: [id number]  Patient YOB: 1970 Friday, June 19, 2020 Lenka Prather MD  RESTRICTIONS:  During your procedure today, you received medications for sedation.  These   medications may affect your judgment, balance and coordination.  Therefore,   for 24 hours, you have the following restrictions:   - DO NOT drive a car, operate machinery, make legal/financial decisions,   sign important papers or drink alcohol.    ACTIVITY:  Today: no heavy lifting, straining or running due to procedural   sedation/anesthesia.  The following day: return to full activity including work.  DIET:  Eat and drink normally unless instructed otherwise.     TREATMENT FOR COMMON SIDE EFFECTS:  - Mild abdominal pain, nausea, belching, bloating or excessive gas:  rest,   eat lightly and use a heating pad.  - Sore Throat: treat with throat lozenges and/or gargle with warm salt   water.  - Because air was used during the procedure, expelling large amounts of air   from your rectum or belching is normal.  - If a bowel prep was taken, you may not have a bowel movement for 1-3 days.    This is normal.  SYMPTOMS TO WATCH FOR AND REPORT TO YOUR PHYSICIAN:  1. Abdominal pain or bloating, other than gas cramps.  2. Chest pain.  3. Back pain.  4. Signs of infection such as: chills or fever occurring within 24 hours   after the procedure.  5. Rectal bleeding, which would show as bright red, maroon, or black stools.   (A tablespoon of blood from the rectum is not serious, especially if   hemorrhoids are present.)  6. Vomiting.  7. Weakness or dizziness.  GO DIRECTLY TO THE NEAREST EMERGENCY ROOM IF YOU HAVE ANY OF THE FOLLOWING:      Difficulty breathing              Chills and/or fever over 101 F   Persistent vomiting and/or vomiting blood   Severe abdominal pain   Severe chest pain   Black, tarry stools   Bleeding- more than one  tablespoon   Any other symptom or condition that you feel may need urgent attention  Your doctor recommends these additional instructions:  If any biopsies were taken, your doctors clinic will contact you in 1 to 2   weeks with any results.  - Await pathology results.   - Discharge patient to home.   - Resume previous diet.   - Continue present medications Protonix can be discontinued and taken daily   as needed.  For questions, problems or results please call your physician Lenka Prather MD at Work:  [phone]  If you have any questions about the above instructions, call the GI   department at [phone] or call the endoscopy unit at [phone]   from 7am until 3 pm.  [facility], EMERGENCY ROOM PHONE NUMBER:   [phone]  IF A COMPLICATION OR EMERGENCY SITUATION ARISES AND YOU ARE UNABLE TO REACH   YOUR PHYSICIAN - GO DIRECTLY TO THE EMERGENCY ROOM.  I have read or have had read to me these discharge instructions for my   procedure and have received a written copy.  I understand these   instructions and will follow-up with my physician if I have any questions.     __________________________________       _____________________________________  Nurse Signature                                          Patient/Designated   Responsible Party Signature  MD Lenka Mcgill MD  6/19/2020 3:26:12 PM  This report has been verified and signed electronically.  [employer]

## 2020-06-19 NOTE — H&P
PRE PROCEDURE H&P    Patient Name: Jelani Whaley  MRN: [id number]  : 1970  Date of Procedure:  2020  Referring Physician: Thuy Quezada PA-C  Primary Physician: [facility]  Procedure Physician: Lenka Prather MD       Planned Procedure: Colonoscopy and EGD  Diagnosis: screening for colon cancer  Chief Complaint: Same as above    HPI: Patient is an 49 y.o. male is here for the above.       Past Medical History:   Past Medical History:   Diagnosis Date    Hyperlipidemia     Hypertension         Past Surgical History:  Past Surgical History:   Procedure Laterality Date    ESOPHAGOGASTRODUODENOSCOPY N/A 3/14/2020    Procedure: EGD (ESOPHAGOGASTRODUODENOSCOPY);  Surgeon: Lenka Prather MD;  Location: [facility];  Service: Endoscopy;  Laterality: N/A;        Home Medications:  Prior to Admission medications    Medication Sig Start Date End Date Taking? Authorizing Provider   amLODIPine (NORVASC) 5 MG tablet HOLD UNTIL  BLOOD PRESSURE IS ABOVE 130/80 3/15/20   Merlene Mclean MD   atorvastatin calcium (LIPITOR ORAL) Take by mouth.    Historical Provider, MD   cetirizine (ZYRTEC) 10 MG tablet Take 10 mg by mouth once daily.    Historical Provider, MD   fluticasone propionate (FLONASE) 50 mcg/actuation nasal spray 1 spray by Each Nostril route once daily.    Historical Provider, MD   pantoprazole (PROTONIX) 40 MG tablet One po bid x 2 weeks , then one po daily for maintenance . Take 30 min before meal 3/15/20   Merlene Mclean MD        Allergies:  Review of patient's allergies indicates:  No Known Allergies     Social History:   Social History     Socioeconomic History    Marital status:      Spouse name: Not on file    Number of children: Not on file    Years of education: Not on file    Highest education level: Not on file   Occupational History    Not on file   Social Needs    Financial resource strain: Not on file    Food insecurity     Worry: Not on  file     Inability: Not on file    Transportation needs     Medical: Not on file     Non-medical: Not on file   Tobacco Use    Smoking status: Never Smoker    Smokeless tobacco: Never Used   Substance and Sexual Activity    Alcohol use: Yes     Frequency: 2-4 times a month     Drinks per session: 3 or 4    Drug use: Never    Sexual activity: Not Currently     Partners: Female   Lifestyle    Physical activity     Days per week: Not on file     Minutes per session: Not on file    Stress: Not on file   Relationships    Social connections     Talks on phone: Not on file     Gets together: Not on file     Attends Shinto service: Not on file     Active member of club or organization: Not on file     Attends meetings of clubs or organizations: Not on file     Relationship status: Not on file   Other Topics Concern    Not on file   Social History Narrative    Not on file       Family History:  No family history on file.    ROS: No acute cardiac events, no acute respiratory complaints.     Physical Exam (all patients):    There were no vitals taken for this visit.  Lungs: Clear to auscultation bilaterally, respirations unlabored  Heart: Regular rate and rhythm, S1 and S2 normal, no obvious murmurs  Abdomen:         Soft, non-tender, bowel sounds normal, no masses, no organomegaly    Lab Results   Component Value Date    WBC 6.76 03/15/2020    WBC 6.76 03/15/2020    MCV 95 03/15/2020    MCV 95 03/15/2020    RDW 15.4 (H) 03/15/2020    RDW 15.4 (H) 03/15/2020     (L) 03/15/2020     (L) 03/15/2020    INR 1.0 03/13/2020     03/15/2020    BUN 13 03/15/2020     03/15/2020    K 3.8 03/15/2020     (H) 03/15/2020        SEDATION PLAN: per anesthesia      History reviewed, vital signs satisfactory, cardiopulmonary status satisfactory, sedation options, risks and plans have been discussed with the patient  All their questions were answered and the patient agrees to the sedation procedures  as planned and the patient is deemed an appropriate candidate for the sedation as planned.    Procedure explained to patient, informed consent obtained and placed in chart.    Lenka Prather  6/19/2020  1:43 PM

## 2020-06-19 NOTE — TRANSFER OF CARE
"Anesthesia Transfer of Care Note    Patient: Jelani Whaley    Procedure(s) Performed: Procedure(s) (LRB):  COLONOSCOPY (N/A)  EGD (ESOPHAGOGASTRODUODENOSCOPY) (N/A)    Patient location: PACU    Anesthesia Type: MAC    Transport from OR: Transported from OR on room air with adequate spontaneous ventilation    Post pain: adequate analgesia    Post assessment: tolerated procedure well and no apparent anesthetic complications    Post vital signs: stable    Level of consciousness: sedated    Nausea/Vomiting: no nausea/vomiting    Complications: none    Transfer of care protocol was followed      Last vitals:   Visit Vitals  BP (!) 98/45   Pulse 77   Temp 36.6 °C (97.9 °F) (Temporal)   Resp 16   Ht 5' 6" (1.676 m)   Wt 109 kg (240 lb 4.8 oz)   SpO2 (!) 92%   BMI 38.79 kg/m²     "

## 2020-06-19 NOTE — [employer] PATIENT INSTRUCTIONS
Discharge Summary/Instructions after an Endoscopic Procedure  Patient Name: Jelani Diez  Patient MRN: [id number]  Patient YOB: 1970 Friday, June 19, 2020 Lenka Prather MD  RESTRICTIONS:  During your procedure today, you received medications for sedation.  These   medications may affect your judgment, balance and coordination.  Therefore,   for 24 hours, you have the following restrictions:   - DO NOT drive a car, operate machinery, make legal/financial decisions,   sign important papers or drink alcohol.    ACTIVITY:  Today: no heavy lifting, straining or running due to procedural   sedation/anesthesia.  The following day: return to full activity including work.  DIET:  Eat and drink normally unless instructed otherwise.     TREATMENT FOR COMMON SIDE EFFECTS:  - Mild abdominal pain, nausea, belching, bloating or excessive gas:  rest,   eat lightly and use a heating pad.  - Sore Throat: treat with throat lozenges and/or gargle with warm salt   water.  - Because air was used during the procedure, expelling large amounts of air   from your rectum or belching is normal.  - If a bowel prep was taken, you may not have a bowel movement for 1-3 days.    This is normal.  SYMPTOMS TO WATCH FOR AND REPORT TO YOUR PHYSICIAN:  1. Abdominal pain or bloating, other than gas cramps.  2. Chest pain.  3. Back pain.  4. Signs of infection such as: chills or fever occurring within 24 hours   after the procedure.  5. Rectal bleeding, which would show as bright red, maroon, or black stools.   (A tablespoon of blood from the rectum is not serious, especially if   hemorrhoids are present.)  6. Vomiting.  7. Weakness or dizziness.  GO DIRECTLY TO THE NEAREST EMERGENCY ROOM IF YOU HAVE ANY OF THE FOLLOWING:      Difficulty breathing              Chills and/or fever over 101 F   Persistent vomiting and/or vomiting blood   Severe abdominal pain   Severe chest pain   Black, tarry stools   Bleeding- more than one  tablespoon   Any other symptom or condition that you feel may need urgent attention  Your doctor recommends these additional instructions:  If any biopsies were taken, your doctors clinic will contact you in 1 to 2   weeks with any results.  - Discharge patient to home.   - Resume previous diet.   - Continue present medications.   - Await pathology results.   - Repeat colonoscopy in 5 years for surveillance based on pathology   results.  For questions, problems or results please call your physician Lenka Prather MD at Work:  [phone]  If you have any questions about the above instructions, call the GI   department at [phone] or call the endoscopy unit at [phone]   from 7am until 3 pm.  [facility], EMERGENCY ROOM PHONE NUMBER:   [phone]  IF A COMPLICATION OR EMERGENCY SITUATION ARISES AND YOU ARE UNABLE TO REACH   YOUR PHYSICIAN - GO DIRECTLY TO THE EMERGENCY ROOM.  I have read or have had read to me these discharge instructions for my   procedure and have received a written copy.  I understand these   instructions and will follow-up with my physician if I have any questions.     __________________________________       _____________________________________  Nurse Signature                                          Patient/Designated   Responsible Party Signature  MD Lenka Mcgill MD  6/19/2020 3:18:49 PM  This report has been verified and signed electronically.  [employer]

## 2020-06-19 NOTE — ANESTHESIA POSTPROCEDURE EVALUATION
Anesthesia Post Evaluation    Patient: Jelani Whaley    Procedure(s) Performed: Procedure(s) (LRB):  COLONOSCOPY (N/A)  EGD (ESOPHAGOGASTRODUODENOSCOPY) (N/A)    Final Anesthesia Type: MAC    Patient location during evaluation: PACU  Patient participation: No - Unable to Participate, Sedation  Level of consciousness: sedated  Post-procedure vital signs: reviewed and stable  Pain management: adequate  Airway patency: patent    PONV status at discharge: No PONV  Anesthetic complications: no      Cardiovascular status: blood pressure returned to baseline and hemodynamically stable  Respiratory status: unassisted and spontaneous ventilation  Hydration status: euvolemic  Follow-up not needed.          Vitals Value Taken Time   BP 98/45 06/19/20 1517   Temp  06/19/20 1519   Pulse 77 06/19/20 1517   Resp 16 06/19/20 1517   SpO2 92 % 06/19/20 1517         No case tracking events are documented in the log.      Pain/Brian Score: Brian Score: 8 (6/19/2020  3:17 PM)

## 2020-06-19 NOTE — DISCHARGE INSTRUCTIONS
Understanding Colon and Rectal Polyps    The colon (also called the large intestine) is a muscular tube that forms the last part of the digestive tract. It absorbs water and stores food waste. The colon is about 4 to 6 feet long. The rectum is the last 6 inches of the colon. The colon and rectum have a smooth lining composed of millions of cells. Changes in these cells can lead to growths in the colon that can become cancerous and should be removed. Multiple tests are available to screen for colon cancer, but the colonoscopy is the most recommended test. During colonoscopy, these polyps can be removed. How often you need this test depends on many things including your condition, your family history, symptoms, and what the findings were at the previous colonoscopy.   When the colon lining changes  Changes that happen in the cells that line the colon or rectum can lead to growths called polyps. Over a period of years, polyps can turn cancerous. Removing polyps early may prevent cancer from ever forming.  Polyps  Polyps are fleshy clumps of tissue that form on the lining of the colon or rectum. Small polyps are usually benign (not cancerous). However, over time, cells in a polyp can change and become cancerous. Certain types of polyps known as adenomatous polyps are premalignant. The risk for invasive cancer increases with the size of the polyp and certain cell and gene features. This means that they can become cancerous if they're not removed. Hyperplastic polyps are benign. They can grow quite large and not turn cancerous.   Cancer  Almost all colorectal cancers start when polyp cells begin growing abnormally. As a cancerous tumor grows, it may involve more and more of the colon or rectum. In time, cancer can also grow beyond the colon or rectum and spread to nearby organs or to glands called lymph nodes. The cells can also travel to other parts of the body. This is known as metastasis. The earlier a cancerous  tumor is removed, the better the chance of preventing its spread.    Date Last Reviewed: 8/1/2016  © 0493-8036 [employer]. [address] [place] [address], [place] [address]. All rights reserved. This information is not intended as a substitute for professional medical care. Always follow your healthcare professional's instructions.        Gastritis (Adult)    Gastritis is inflammation and irritation of the stomach lining. It can be present for a short time (acute) or be long lasting (chronic). Gastritis is often caused by infection with bacteria called H pylori. More than a third of people in the [place] have this bacteria in their bodies. In many cases, H pylori causes no problems or symptoms. In some people, though, the infection irritates the stomach lining and causes gastritis. Other causes of stomach irritation include drinking alcohol or taking pain-relieving medicines called NSAIDs (such as aspirin or ibuprofen).   Symptoms of gastritis can include:  · Abdominal pain or bloating  · Loss of appetite  · Nausea or vomiting  · Vomiting blood or having black stools  · Feeling more tired than usual  An inflamed and irritated stomach lining is more likely to develop a sore called an ulcer. To help prevent this, gastritis should be treated.  Home care  If needed, medicines may be prescribed. If you have H pylori infection, treating it will likely relieve your symptoms. Other changes can help reduce stomach irritation and help it heal.  · If you have been prescribed medicines for H pylori infection, take them as directed. Take all of the medicine until it is finished or your healthcare provider tells you to stop, even if you feel better.  · Your healthcare provider may recommend avoiding NSAIDs. If you take daily aspirin for your heart or other medical reasons, do not stop without talking to your healthcare provider first.  · Avoid drinking alcohol.  · Stop smoking. Smoking can irritate the stomach and delay  healing. As much as possible, stay away from second hand smoke.  Follow-up care  Follow up with your healthcare provider, or as advised by our staff. Testing may be needed to check for inflammation or an ulcer.  When to seek medical advice  Call your healthcare provider for any of the following:  · Stomach pain that gets worse or moves to the lower right abdomen (appendix area)  · Chest pain that appears or gets worse, or spreads to the back, neck, shoulder, or arm  · Frequent vomiting (cant keep down liquids)  · Blood in the stool or vomit (red or black in color)  · Feeling weak or dizzy  · Fever of 100.4ºF (38ºC) or higher, or as directed by your healthcare provider  Date Last Reviewed: 6/22/2015 © 2000-2017 [employer]. [address] [place] [address], [place] [address]. All rights reserved. This information is not intended as a substitute for professional medical care. Always follow your healthcare professional's instructions.

## 2020-06-23 LAB
FINAL PATHOLOGIC DIAGNOSIS: NORMAL
GROSS: NORMAL

## 2020-06-25 NOTE — PROGRESS NOTES
Two large polyps removed were sessile serrated adenomas. The pathology showed complete removal, not cancer but if not removed have a risk to progress to cancer. The other polyp was a hyperplastic polyp with no risk at all. Based on the results he will need a repeat colonoscopy in 3 years instead of 5 years as documented in his procedure report.    Lenka Prather MD  Gastroenterology

## 2020-07-02 ENCOUNTER — NURSE TRIAGE (OUTPATIENT)
Dept: ADMINISTRATIVE | Facility: CLINIC | Age: 50
End: 2020-07-02

## 2020-07-02 NOTE — TELEPHONE ENCOUNTER
Reason for Disposition   Second attempt to contact family AND no contact made.  Phone number verified.    Additional Information   Negative: Caller is angry or rude (e.g., hangs up, verbally abusive, yelling)   Negative: Caller hangs up   Negative: Caller has already spoken with the PCP and has no further questions.   Negative: Caller has already spoken with another triager and has no further questions.   Negative: Caller has already spoken with another triager or PCP AND has further questions AND triager able to answer questions.   Negative: Wrong number reached.  Phone number verified.   Negative: Message left with person in household.   Negative: Message left on unidentified voice mail.  Phone number verified.   Negative: Message left on identified voice mail   Negative: No answer.  First attempt to contact caller.  Follow-up call scheduled within 15 minutes.   Negative: Busy signal.  First attempt to contact caller.  Follow-up call scheduled within 15 minutes.    Protocols used: NO CONTACT OR DUPLICATE CONTACT CALL-A-

## 2020-07-02 NOTE — TELEPHONE ENCOUNTER
Attempted to contact patient on behalf of [facility]'s post procedure system tracker.  Phone number not in service and second number no answer

## 2021-04-28 ENCOUNTER — PATIENT MESSAGE (OUTPATIENT)
Dept: RESEARCH | Facility: HOSPITAL | Age: 51
End: 2021-04-28

## 2022-01-13 ENCOUNTER — OFFICE VISIT (OUTPATIENT)
Dept: URGENT CARE | Facility: CLINIC | Age: 52
End: 2022-01-13
Payer: COMMERCIAL

## 2022-01-13 VITALS
SYSTOLIC BLOOD PRESSURE: 129 MMHG | RESPIRATION RATE: 18 BRPM | HEIGHT: 66 IN | TEMPERATURE: 98 F | OXYGEN SATURATION: 97 % | DIASTOLIC BLOOD PRESSURE: 78 MMHG | HEART RATE: 66 BPM | BODY MASS INDEX: 38.57 KG/M2 | WEIGHT: 240 LBS

## 2022-01-13 DIAGNOSIS — R05.9 COUGH: Primary | ICD-10-CM

## 2022-01-13 DIAGNOSIS — R51.9 SINUS HEADACHE: ICD-10-CM

## 2022-01-13 DIAGNOSIS — U07.1 COVID-19 VIRUS DETECTED: ICD-10-CM

## 2022-01-13 DIAGNOSIS — Z20.822 SUSPECTED COVID-19 VIRUS INFECTION: ICD-10-CM

## 2022-01-13 LAB
CTP QC/QA: YES
SARS-COV-2 RDRP RESP QL NAA+PROBE: POSITIVE

## 2022-01-13 PROCEDURE — 3074F PR MOST RECENT SYSTOLIC BLOOD PRESSURE < 130 MM HG: ICD-10-PCS | Mod: CPTII,S$GLB,, | Performed by: NURSE PRACTITIONER

## 2022-01-13 PROCEDURE — 1159F MED LIST DOCD IN RCRD: CPT | Mod: CPTII,S$GLB,, | Performed by: NURSE PRACTITIONER

## 2022-01-13 PROCEDURE — 3078F PR MOST RECENT DIASTOLIC BLOOD PRESSURE < 80 MM HG: ICD-10-PCS | Mod: CPTII,S$GLB,, | Performed by: NURSE PRACTITIONER

## 2022-01-13 PROCEDURE — 3078F DIAST BP <80 MM HG: CPT | Mod: CPTII,S$GLB,, | Performed by: NURSE PRACTITIONER

## 2022-01-13 PROCEDURE — 99204 PR OFFICE/OUTPT VISIT, NEW, LEVL IV, 45-59 MIN: ICD-10-PCS | Mod: S$GLB,,, | Performed by: NURSE PRACTITIONER

## 2022-01-13 PROCEDURE — U0002: ICD-10-PCS | Mod: QW,S$GLB,, | Performed by: NURSE PRACTITIONER

## 2022-01-13 PROCEDURE — 3074F SYST BP LT 130 MM HG: CPT | Mod: CPTII,S$GLB,, | Performed by: NURSE PRACTITIONER

## 2022-01-13 PROCEDURE — 1160F PR REVIEW ALL MEDS BY PRESCRIBER/CLIN PHARMACIST DOCUMENTED: ICD-10-PCS | Mod: CPTII,S$GLB,, | Performed by: NURSE PRACTITIONER

## 2022-01-13 PROCEDURE — 3008F PR BODY MASS INDEX (BMI) DOCUMENTED: ICD-10-PCS | Mod: CPTII,S$GLB,, | Performed by: NURSE PRACTITIONER

## 2022-01-13 PROCEDURE — 99204 OFFICE O/P NEW MOD 45 MIN: CPT | Mod: S$GLB,,, | Performed by: NURSE PRACTITIONER

## 2022-01-13 PROCEDURE — 1159F PR MEDICATION LIST DOCUMENTED IN MEDICAL RECORD: ICD-10-PCS | Mod: CPTII,S$GLB,, | Performed by: NURSE PRACTITIONER

## 2022-01-13 PROCEDURE — U0002 COVID-19 LAB TEST NON-CDC: HCPCS | Mod: QW,S$GLB,, | Performed by: NURSE PRACTITIONER

## 2022-01-13 PROCEDURE — 1160F RVW MEDS BY RX/DR IN RCRD: CPT | Mod: CPTII,S$GLB,, | Performed by: NURSE PRACTITIONER

## 2022-01-13 PROCEDURE — 3008F BODY MASS INDEX DOCD: CPT | Mod: CPTII,S$GLB,, | Performed by: NURSE PRACTITIONER

## 2022-01-13 RX ORDER — PROMETHAZINE HYDROCHLORIDE AND DEXTROMETHORPHAN HYDROBROMIDE 6.25; 15 MG/5ML; MG/5ML
5 SYRUP ORAL
Qty: 180 ML | Refills: 0 | Status: SHIPPED | OUTPATIENT
Start: 2022-01-13

## 2022-01-13 RX ORDER — ZOLPIDEM TARTRATE 10 MG/1
TABLET ORAL
COMMUNITY

## 2022-01-13 RX ORDER — DIPHENHYDRAMINE HCL 25 MG
CAPSULE ORAL
COMMUNITY
Start: 2021-11-29

## 2022-01-13 RX ORDER — TRIAMTERENE AND HYDROCHLOROTHIAZIDE 75; 50 MG/1; MG/1
1 TABLET ORAL
COMMUNITY

## 2022-01-13 RX ORDER — CITALOPRAM 20 MG/1
TABLET, FILM COATED ORAL
COMMUNITY

## 2022-01-13 NOTE — PROGRESS NOTES
"Subjective:       Patient ID: Jelani Whaley is a 51 y.o. male.    Vitals:  height is 5' 6" (1.676 m) and weight is 108.9 kg (240 lb). His temperature is 98.2 °F (36.8 °C). His blood pressure is 129/78 and his pulse is 66. His respiration is 18 and oxygen saturation is 97%.     Chief Complaint: COVID-19 Concerns    Pt states he started with a dry cough a couple of days ago. Pt states he also is very tired. Pt works around a lot of people and could have been exposed to COVID.    Other  This is a new problem. The current episode started in the past 7 days. The problem occurs constantly. The problem has been unchanged. Associated symptoms include coughing and fatigue. Pertinent negatives include no abdominal pain, anorexia, arthralgias, change in bowel habit, chest pain, chills, congestion, diaphoresis, fever, headaches, joint swelling, myalgias, nausea, neck pain, numbness, rash, sore throat, swollen glands, urinary symptoms, vertigo, visual change, vomiting or weakness. Nothing aggravates the symptoms. He has tried nothing for the symptoms. The treatment provided no relief.       Constitution: Positive for fatigue. Negative for chills, sweating and fever.   HENT: Negative for congestion and sore throat.    Neck: Negative for neck pain.   Cardiovascular: Negative for chest pain.   Respiratory: Positive for cough.    Gastrointestinal: Negative for abdominal pain, nausea and vomiting.   Musculoskeletal: Negative for joint pain, joint swelling and muscle ache.   Skin: Negative for rash.   Neurological: Negative for history of vertigo, headaches and numbness.         Past Medical History:   Diagnosis Date    Hyperlipidemia     Hypertension          .  Past Surgical History:   Procedure Laterality Date    COLONOSCOPY N/A 6/19/2020    Procedure: COLONOSCOPY;  Surgeon: Lenka Prather MD;  Location: [facility];  Service: Endoscopy;  Laterality: N/A;    ESOPHAGOGASTRODUODENOSCOPY N/A 3/14/2020    Procedure: " EGD (ESOPHAGOGASTRODUODENOSCOPY);  Surgeon: Lenka Prather MD;  Location: [facility];  Service: Endoscopy;  Laterality: N/A;    ESOPHAGOGASTRODUODENOSCOPY N/A 6/19/2020    Procedure: EGD (ESOPHAGOGASTRODUODENOSCOPY);  Surgeon: Lenka Prather MD;  Location: [facility];  Service: Endoscopy;  Laterality: N/A;         Social History     Socioeconomic History    Marital status:    Tobacco Use    Smoking status: Never Smoker    Smokeless tobacco: Never Used   Substance and Sexual Activity    Alcohol use: Yes    Drug use: Never       History reviewed. No pertinent family history.      ROS:  GENERAL: fever, chills with body aches/fatigue  SKIN: No rashes, itching or changes in color or texture of skin.   HEENT:  Reports runny nose, nasal congestion, headache  NODES: No masses or lesions. Denies swollen glands.   CHEST: reports cough   CARDIOVASCULAR: Denies chest pain, shortness of breath.  ABDOMEN: Appetite fine. No weight loss. Denies diarrhea, abdominal pain  MUSCULOSKELETAL: No joint stiffness or swelling. Denies back pain.  NEUROLOGIC: No history of seizures, paralysis, alteration of gait or coordination.  PSYCHIATRIC: Denies mood swings, depression or suicidal thoughts.    PE:   APPEARANCE: Well nourished, well developed, in mild distress.   V/S: Reviewed.  SKIN: Normal skin turgor, no lesions.  HEENT: Turbinates injected, TM's poor light reflex bilateral, no facial tenderness.  CHEST: Lungs clear to auscultation. No wheezing  CARDIOVASCULAR: Regular rate and rhythm.  NEUROLOGIC: No sensory deficits. Gait & Posture: Normal, No cerebellar signs.  MENTAL STATUS: Patient alert, oriented x 3 & conversant.    PLAN: Lab work POCT rapid Covid 19 screen/ positive  Advise referred to [facility] infusion  Advise increase p.o. fluids--water/juice & rest  Meds:  Phenergan DM  / no refills  Simply saline nasal wash to irrigate sinuses and for congestion/runny nose.  Cool mist  humidifier/vaporizer.  Practice good handwashing.  Advise use of green tea with honey  Tylenol or Ibuprofen for fever, headache and body aches.  Warm salt water gargles for throat comfort.  Chloraseptic spray or lozenges for throat comfort.  Advise follow up with PCP in 2-3 days for recheck  Advise go to ER if symptoms worsen or fail to improve with treatment.  Instructions, follow up, and supportive care as per AVS.  AVS provided and reviewed with patient including supportive care, follow up, and red flag symptoms.   Patient verbalizes understanding and agrees with treatment plan. Discharged from Urgent Care in stable condition.  · Stay home except to get medical care.  · Separate yourself from other people and animals in your home  · Call ahead before visiting your doctor.  · Wear a facemask.  · Cover your coughs and sneezes.  · Clean your hands often.  · Avoid sharing personal household items.  · Clean all high-touch surfaces every day.  · Monitor your symptoms. Seek prompt medical attention if your illness is worsening (e.g., difficulty breathing). Before seeking care, call your healthcare provider.  · If you have a medical emergency and need to call 911, notify the dispatch personnel that you have, or are being evaluated for COVID-19. If possible, put on a facemask before emergency medical services arrive.  · You have tested positive for COVID-19 today.    ·   ·    ·   · ISOLATION  ·   · If you tested positive and do not have symptoms, you must isolate for 5 days starting on the day of the positive test. I    ·    ·   · If you tested positive and have symptoms, you must isolate for 5 days starting on the day of the first symptoms,  not the day of the positive test.  ·   ·    ·   · This is the most important part, both the CDC and the [facility] emphasize that you do not test out of isolation.  ·   ·    ·   · After 5 days, if your symptoms have improved and you have not had fever on day 5, you can return to the  community on day 6- NO TESTING REQUIRED!   ·   ·    ·   · In fact, we do not retest if you were positive in the last 90 days.  ·   ·    ·   · After your 5 days of isolation are completed, the CDC recommends strict mask use for the first 5 days that you come out of isolation.  ·   ·      10    DIAGNOSIS:   pharyngitis  Hypertension  Flu like symptoms  Suspect COVID-19  COVID-19 virus detected   FAMILY HISTORY:  No pertinent family history in first degree relatives

## 2022-01-13 NOTE — PATIENT INSTRUCTIONS
PLAN: Lab work POCT rapid Covid 19 screen/ positive  Advise referred to [facility] infusion  Advise increase p.o. fluids--water/juice & rest  Meds:  Phenergan DM  / no refills  Simply saline nasal wash to irrigate sinuses and for congestion/runny nose.  Cool mist humidifier/vaporizer.  Practice good handwashing.  Advise use of green tea with honey  Tylenol or Ibuprofen for fever, headache and body aches.  Warm salt water gargles for throat comfort.  Chloraseptic spray or lozenges for throat comfort.  Advise follow up with PCP in 2-3 days for recheck  Advise go to ER if symptoms worsen or fail to improve with treatment.  Instructions, follow up, and supportive care as per AVS.  AVS provided and reviewed with patient including supportive care, follow up, and red flag symptoms.   Patient verbalizes understanding and agrees with treatment plan. Discharged from Urgent Care in stable condition.  · Stay home except to get medical care.  · Separate yourself from other people and animals in your home  · Call ahead before visiting your doctor.  · Wear a facemask.  · Cover your coughs and sneezes.  · Clean your hands often.  · Avoid sharing personal household items.  · Clean all high-touch surfaces every day.  · Monitor your symptoms. Seek prompt medical attention if your illness is worsening (e.g., difficulty breathing). Before seeking care, call your healthcare provider.  · If you have a medical emergency and need to call 911, notify the dispatch personnel that you have, or are being evaluated for COVID-19. If possible, put on a facemask before emergency medical services arrive.  · You have tested positive for COVID-19 today.    ·   ·    ·   · ISOLATION  ·   · If you tested positive and do not have symptoms, you must isolate for 5 days starting on the day of the positive test. I    ·    ·   · If you tested positive and have symptoms, you must isolate for 5 days starting on the day of the first symptoms,  not the day of the  positive test.  ·   ·    ·   · This is the most important part, both the CDC and the [facility] emphasize that you do not test out of isolation.  ·   ·    ·   · After 5 days, if your symptoms have improved and you have not had fever on day 5, you can return to the community on day 6- NO TESTING REQUIRED!   ·   ·    ·   · In fact, we do not retest if you were positive in the last 90 days.  ·   ·    ·   · After your 5 days of isolation are completed, the CDC recommends strict mask use for the first 5 days that you come out of isolation.  ·   ·      10

## 2023-03-15 NOTE — PLAN OF CARE
Continue 40 mEq bid  Recheck BMP on Friday  Pt reports good appetite and no diarrhea POC reviewed, verbalized understanding. Pt remained free from falls, fall precautions in place. Pt is NSR on monitor, 60-80s. VSS. No other c/o at this time. PIV intact, infusing NS. Call bell and personal belongings within reach. Hourly rounding complete. Reminded to call for assistance. Will continue to monitor.

## 2023-04-26 NOTE — ASSESSMENT & PLAN NOTE
-Transfuse 2 units PRBC.   -Monitor hemoglobin and hematocrit.    Quality 226: Preventive Care And Screening: Tobacco Use: Screening And Cessation Intervention: Patient screened for tobacco use and is an ex/non-smoker Quality 130: Documentation Of Current Medications In The Medical Record: Current Medications Documented Detail Level: Detailed Quality 431: Preventive Care And Screening: Unhealthy Alcohol Use - Screening: Patient not identified as an unhealthy alcohol user when screened for unhealthy alcohol use using a systematic screening method

## 2023-11-08 ENCOUNTER — OFFICE VISIT (OUTPATIENT)
Dept: URGENT CARE | Facility: CLINIC | Age: 53
End: 2023-11-08
Payer: OTHER GOVERNMENT

## 2023-11-08 VITALS
OXYGEN SATURATION: 96 % | DIASTOLIC BLOOD PRESSURE: 70 MMHG | HEART RATE: 64 BPM | RESPIRATION RATE: 20 BRPM | WEIGHT: 216.81 LBS | HEIGHT: 68 IN | SYSTOLIC BLOOD PRESSURE: 112 MMHG | TEMPERATURE: 98 F | BODY MASS INDEX: 32.86 KG/M2

## 2023-11-08 DIAGNOSIS — J01.90 ACUTE BACTERIAL SINUSITIS: Primary | ICD-10-CM

## 2023-11-08 DIAGNOSIS — B96.89 ACUTE BACTERIAL SINUSITIS: Primary | ICD-10-CM

## 2023-11-08 DIAGNOSIS — R05.9 COUGH, UNSPECIFIED TYPE: ICD-10-CM

## 2023-11-08 LAB
CTP QC/QA: YES
SARS-COV-2 AG RESP QL IA.RAPID: NEGATIVE

## 2023-11-08 PROCEDURE — 99213 OFFICE O/P EST LOW 20 MIN: CPT | Mod: S$GLB,,, | Performed by: PHYSICIAN ASSISTANT

## 2023-11-08 PROCEDURE — 87811 SARS-COV-2 COVID19 W/OPTIC: CPT | Mod: QW,S$GLB,, | Performed by: PHYSICIAN ASSISTANT

## 2023-11-08 PROCEDURE — 99213 PR OFFICE/OUTPT VISIT, EST, LEVL III, 20-29 MIN: ICD-10-PCS | Mod: S$GLB,,, | Performed by: PHYSICIAN ASSISTANT

## 2023-11-08 PROCEDURE — 87811 SARS CORONAVIRUS 2 ANTIGEN POCT, MANUAL READ: ICD-10-PCS | Mod: QW,S$GLB,, | Performed by: PHYSICIAN ASSISTANT

## 2023-11-08 RX ORDER — AMOXICILLIN AND CLAVULANATE POTASSIUM 875; 125 MG/1; MG/1
1 TABLET, FILM COATED ORAL 2 TIMES DAILY
Qty: 10 TABLET | Refills: 0 | Status: SHIPPED | OUTPATIENT
Start: 2023-11-08 | End: 2023-11-13

## 2023-11-08 RX ORDER — PROMETHAZINE HYDROCHLORIDE AND DEXTROMETHORPHAN HYDROBROMIDE 6.25; 15 MG/5ML; MG/5ML
5 SYRUP ORAL NIGHTLY PRN
Qty: 118 ML | Refills: 0 | Status: SHIPPED | OUTPATIENT
Start: 2023-11-08

## 2023-11-08 RX ORDER — PREDNISONE 20 MG/1
20 TABLET ORAL DAILY
Qty: 5 TABLET | Refills: 0 | Status: SHIPPED | OUTPATIENT
Start: 2023-11-08 | End: 2023-11-13

## 2023-11-08 NOTE — PATIENT INSTRUCTIONS
Advise increase p.o. fluids--at least 64 ounces of water/juice & rest  Meds:  Augmentin, prednisone, and Promethazine DM sent to pharmacy.  Advise complete antibiotics.  Normal saline nasal wash to irrigate sinuses and for congestion/runny nose.  Cool mist humidifier/vaporizer.  Practice good handwashing.  Mucinex for cough and chest congestion.  Tylenol or Ibuprofen for fever, headache and body aches.  Warm salt water gargles for throat comfort.  Chloraseptic spray or lozenges for throat comfort.  See PCP or go to ER if symptoms worsen or fail to improve with treatment.

## 2023-11-08 NOTE — PROGRESS NOTES
"Subjective:      Patient ID: Jelani Whaley is a 52 y.o. male.    Vitals:  height is 5' 7.8" (1.722 m) and weight is 98.4 kg (216 lb 13.2 oz). His tympanic temperature is 98.4 °F (36.9 °C). His blood pressure is 112/70 and his pulse is 64. His respiration is 20 and oxygen saturation is 96%.     Chief Complaint: Cough    Patient presents with cough and congestion.  Symptoms started 3 days ago with itchy throat.  Taking Benadryl, Zyrtec and Flonase with no relief.  No known sick contact.  Denies fever, body aches, or chills.    Cough  This is a new problem. The current episode started in the past 7 days (3). The problem has been gradually worsening. The cough is Productive of sputum. Associated symptoms include ear pain, headaches (sinus), postnasal drip, rhinorrhea and a sore throat (itchy). Pertinent negatives include no chills, fever or sweats.       Constitution: Negative for chills and fever.   HENT:  Positive for ear pain, postnasal drip and sore throat (itchy).    Respiratory:  Positive for cough.    Neurological:  Positive for headaches (sinus).      Objective:     Physical Exam   Constitutional: He is oriented to person, place, and time. He appears well-developed. He is cooperative.  Non-toxic appearance. He does not appear ill. No distress.   HENT:   Head: Normocephalic and atraumatic.   Ears:   Right Ear: Hearing, tympanic membrane, external ear and ear canal normal.   Left Ear: Hearing, tympanic membrane, external ear and ear canal normal.   Nose: No mucosal edema, rhinorrhea or nasal deformity. No epistaxis. Right sinus exhibits maxillary sinus tenderness and frontal sinus tenderness. Left sinus exhibits maxillary sinus tenderness and frontal sinus tenderness.   Mouth/Throat: Uvula is midline, oropharynx is clear and moist and mucous membranes are normal. No trismus in the jaw. Normal dentition. No uvula swelling. Cobblestoning present. No oropharyngeal exudate, posterior oropharyngeal edema or " posterior oropharyngeal erythema. No tonsillar exudate.   Eyes: Conjunctivae and lids are normal. No scleral icterus.   Neck: Trachea normal and phonation normal. Neck supple. No edema present. No erythema present. No neck rigidity present.   Cardiovascular: Normal rate, regular rhythm, normal heart sounds and normal pulses.   Pulmonary/Chest: Effort normal and breath sounds normal. No respiratory distress. He has no decreased breath sounds. He has no rhonchi.   Abdominal: Normal appearance.   Musculoskeletal: Normal range of motion.         General: No deformity. Normal range of motion.   Neurological: He is alert and oriented to person, place, and time. He exhibits normal muscle tone. Coordination normal.   Skin: Skin is warm, dry, intact, not diaphoretic and not pale.   Psychiatric: His speech is normal and behavior is normal. Judgment and thought content normal.   Nursing note and vitals reviewed.      Assessment:     1. Acute bacterial sinusitis    2. Cough, unspecified type        Plan:       Acute bacterial sinusitis  -     amoxicillin-clavulanate 875-125mg (AUGMENTIN) 875-125 mg per tablet; Take 1 tablet by mouth 2 (two) times daily. for 5 days  Dispense: 10 tablet; Refill: 0  -     predniSONE (DELTASONE) 20 MG tablet; Take 1 tablet (20 mg total) by mouth once daily. for 5 days  Dispense: 5 tablet; Refill: 0  -     promethazine-dextromethorphan (PROMETHAZINE-DM) 6.25-15 mg/5 mL Syrp; Take 5 mLs by mouth nightly as needed (cough).  Dispense: 118 mL; Refill: 0    Cough, unspecified type  -     SARS Coronavirus 2 Antigen, POCT Manual Read  -     promethazine-dextromethorphan (PROMETHAZINE-DM) 6.25-15 mg/5 mL Syrp; Take 5 mLs by mouth nightly as needed (cough).  Dispense: 118 mL; Refill: 0      Results for orders placed or performed in visit on 11/08/23   SARS Coronavirus 2 Antigen, POCT Manual Read   Result Value Ref Range    SARS Coronavirus 2 Antigen Negative Negative     Acceptable Yes         Patient Instructions     Advise increase p.o. fluids--at least 64 ounces of water/juice & rest  Meds:  Augmentin, prednisone, and Promethazine DM sent to pharmacy.  Advise complete antibiotics.  Normal saline nasal wash to irrigate sinuses and for congestion/runny nose.  Cool mist humidifier/vaporizer.  Practice good handwashing.  Mucinex for cough and chest congestion.  Tylenol or Ibuprofen for fever, headache and body aches.  Warm salt water gargles for throat comfort.  Chloraseptic spray or lozenges for throat comfort.  See PCP or go to ER if symptoms worsen or fail to improve with treatment.

## 2023-11-11 ENCOUNTER — TELEPHONE (OUTPATIENT)
Dept: URGENT CARE | Facility: CLINIC | Age: 53
End: 2023-11-11
Payer: OTHER GOVERNMENT

## 2023-11-11 RX ORDER — BENZONATATE 100 MG/1
100 CAPSULE ORAL 3 TIMES DAILY PRN
Qty: 30 CAPSULE | Refills: 0 | Status: SHIPPED | OUTPATIENT
Start: 2023-11-11 | End: 2023-11-21

## 2023-11-11 NOTE — TELEPHONE ENCOUNTER
Courtesy call made.  Spoke with Patient(Self) and he states that he is still having a cough.  He requested that we send in Tessalon Perles.  Please be advised.  He can be reached at [phone]

## 2023-12-11 ENCOUNTER — OFFICE VISIT (OUTPATIENT)
Dept: OTOLARYNGOLOGY | Facility: CLINIC | Age: 53
End: 2023-12-11
Payer: OTHER GOVERNMENT

## 2023-12-11 VITALS — WEIGHT: 222.69 LBS | BODY MASS INDEX: 34.06 KG/M2

## 2023-12-11 DIAGNOSIS — R05.8 POST-VIRAL COUGH SYNDROME: Primary | ICD-10-CM

## 2023-12-11 PROCEDURE — 99213 OFFICE O/P EST LOW 20 MIN: CPT | Mod: PBBFAC | Performed by: STUDENT IN AN ORGANIZED HEALTH CARE EDUCATION/TRAINING PROGRAM

## 2023-12-11 PROCEDURE — 99999 PR PBB SHADOW E&M-EST. PATIENT-LVL III: ICD-10-PCS | Mod: PBBFAC,,, | Performed by: STUDENT IN AN ORGANIZED HEALTH CARE EDUCATION/TRAINING PROGRAM

## 2023-12-11 PROCEDURE — 99204 OFFICE O/P NEW MOD 45 MIN: CPT | Mod: 25,S$PBB,, | Performed by: STUDENT IN AN ORGANIZED HEALTH CARE EDUCATION/TRAINING PROGRAM

## 2023-12-11 PROCEDURE — 31231 NASAL ENDOSCOPY DX: CPT | Mod: 25,PBBFAC | Performed by: STUDENT IN AN ORGANIZED HEALTH CARE EDUCATION/TRAINING PROGRAM

## 2023-12-11 PROCEDURE — 99243 PR OFFICE CONSULTATION,LEVEL III: ICD-10-PCS | Mod: 25,S$PBB,, | Performed by: STUDENT IN AN ORGANIZED HEALTH CARE EDUCATION/TRAINING PROGRAM

## 2023-12-11 PROCEDURE — 31231 PR NASAL ENDOSCOPY, DX: ICD-10-PCS | Mod: S$PBB,,, | Performed by: STUDENT IN AN ORGANIZED HEALTH CARE EDUCATION/TRAINING PROGRAM

## 2023-12-11 PROCEDURE — 31231 NASAL ENDOSCOPY DX: CPT | Mod: S$PBB,,, | Performed by: STUDENT IN AN ORGANIZED HEALTH CARE EDUCATION/TRAINING PROGRAM

## 2023-12-11 PROCEDURE — 99999 PR PBB SHADOW E&M-EST. PATIENT-LVL III: CPT | Mod: PBBFAC,,, | Performed by: STUDENT IN AN ORGANIZED HEALTH CARE EDUCATION/TRAINING PROGRAM

## 2023-12-11 RX ORDER — AZELASTINE 1 MG/ML
1 SPRAY, METERED NASAL 2 TIMES DAILY
COMMUNITY

## 2023-12-11 RX ORDER — IPRATROPIUM BROMIDE 21 UG/1
2 SPRAY, METERED NASAL 2 TIMES DAILY
Qty: 30 ML | Refills: 3 | Status: SHIPPED | OUTPATIENT
Start: 2023-12-11

## 2023-12-11 NOTE — PROGRESS NOTES
Chief complaint:    Chief Complaint   Patient presents with    Sinus Problem     PND causing cough           Referring Provider:  [facility]  [address] [place] [address]      History of present illness:     Mr. Whaley is a 52 y.o. presenting for evaluation of sinus issues.     The patient reports the following allergy/sinus symptoms:     Major sinusitis symptoms:  No - Purulent anterior nasal discharge  Yes - Purulent or discolored posterior nasal discharge  Yes - Nasal congestion or obstruction  No - Facial Congestion or fullness  No - Hyposmia or anosmia  No - Fever    Additional symptoms include coughing, worse when lying. Productive, thick, discolored. Feels its coming from a post nasal drip.    Denies shortness of breath, wheezing.    Symptoms have been present for several years, but worse over the lat 2 months after a URI and ear infection. In between episodes the patient's symptoms do not completely resolve.  Severity of symptoms: moderate.   Treatment has included: 5 days of prednisone and augmentin. Taking Benadryl at night, Zyrtec and Flonase with partial relief. Also just started astelin about 2 weeks ago.  Prior sinus surgery: no.    Allergy history: seasonal. Allergy testing for this patient has not been done. Treatment has included: oral antihistamine, nasal steroid spray    Asthma history: No     Current smoker:  No       History      Past Medical History:   Past Medical History:   Diagnosis Date    Hyperlipidemia     Hypertension          Past Surgical History:  Past Surgical History:   Procedure Laterality Date    COLONOSCOPY N/A 6/19/2020    Procedure: COLONOSCOPY;  Surgeon: Lenka Prather MD;  Location: [facility];  Service: Endoscopy;  Laterality: N/A;    ESOPHAGOGASTRODUODENOSCOPY N/A 3/14/2020    Procedure: EGD (ESOPHAGOGASTRODUODENOSCOPY);  Surgeon: Lenka Prather MD;  Location: [facility];  Service: Endoscopy;  Laterality: N/A;     ESOPHAGOGASTRODUODENOSCOPY N/A 6/19/2020    Procedure: EGD (ESOPHAGOGASTRODUODENOSCOPY);  Surgeon: Lenka Prather MD;  Location: [facility];  Service: Endoscopy;  Laterality: N/A;         Medications: Medication list reviewed. He  has a current medication list which includes the following prescription(s): amlodipine, atorvastatin calcium, azelastine, cetirizine, diphenhydramine, pantoprazole, citalopram, fluticasone propionate, ipratropium, promethazine-dextromethorphan, promethazine-dextromethorphan, triamterene-hydrochlorothiazide 75-50 mg, and zolpidem.     Allergies: Review of patient's allergies indicates:  No Known Allergies      Family history: family history includes No Known Problems in his father and mother.         Social History          Alcohol use:  reports current alcohol use.            Tobacco:  reports that he has never smoked. He has never used smokeless tobacco.         Physical Examination      Vitals: Weight 101 kg (222 lb 10.6 oz).      General: Well developed, well nourished, well hydrated.     Voice: no dysphonia, no dysarthria      Head/Face: Normocephalic, atraumatic. No scars or lesions. Facial musculature equal.     Eyes: No scleral icterus or conjunctival hemorrhage. EOMI. PERRLA.     Ears:     Right ear: No gross deformity. EAC is clear of debris and erythema. TM are intact with a pneumatized middle ear. No signs of retraction, fluid or infection.      Left ear: No gross deformity. EAC is clear of debris and erythema. TM are intact with a pneumatized middle ear. No signs of retraction, fluid or infection.      Nose: No gross deformity or lesions. No purulent discharge. No significant NSD.     Mouth/Oropharynx: Lips without any lesions. No mucosal lesions within the oropharynx. No tonsillar exudate or lesions. Pharyngeal walls symmetrical. Uvula midline. Tongue midline without lesions.     Neurologic: Moving all extremities without gross abnormality.CN II-XII grossly  intact. House-Brackmann 1/6. No signs of nystagmus.          Data reviewed      Review of records:      I reviewed records from the referring provider's office visits describing the history, workup, and/or treatment of this problem thus far.       Procedure note  NASAL ENDOSCOPY       Indication: Jelani Whaley is a 52 y.o. male  with sinonasal symptoms that were not able to be explained by anterior rhinoscopy alone, thus necessitating nasal endoscopy.     Procedure: Risks, benefits, and alternatives of the procedure were discussed with the patient, and the patient consented to the nasal endoscopy.  The nasal cavity was sprayed with a topical decongestant and anesthetic (if needed). The endoscope was passed into each nostril and each nasal cavity was visualized.  On each side the nasal cavity, sinuses (if open), turbinates, middle and superior meatus, sphenoethmoidal recess and septum were examined with the findings described below. At the end of the examination, the scope was removed. The patient tolerated the procedure well with no complications.       Endoscopic Sinonasal Exam Findings:  -     The right side has normal mucosa  -     The left side has normal mucosa  -     Nasal secretions: No discolored secretions noted bilaterally  -     Nasal septum: mild right septal deviation  -     Inferior turbinate: Normal mucosa without significant hypertrophy bilaterally  -     Middle turbinate: Normal mucosa without significant hypertrophy bilaterally  -     Other findings: none      Assessment/Plan:    1. Post-viral cough syndrome        Suspect post viral PND and cough  Continue flonase and oral antihistamine  Add atrovent x 1 month  Scan and f/u if not improved        Aditya Coon MD  [facility]   [facility]  [id number] [place] [address].  [place] [id number]  P: [phone]  F: [phone]

## 2024-09-27 ENCOUNTER — OFFICE VISIT (OUTPATIENT)
Dept: GASTROENTEROLOGY | Facility: CLINIC | Age: 54
End: 2024-09-27
Payer: OTHER GOVERNMENT

## 2024-09-27 VITALS
BODY MASS INDEX: 33.65 KG/M2 | SYSTOLIC BLOOD PRESSURE: 116 MMHG | WEIGHT: 222 LBS | HEART RATE: 67 BPM | DIASTOLIC BLOOD PRESSURE: 79 MMHG | HEIGHT: 68 IN

## 2024-09-27 DIAGNOSIS — Z86.010 HISTORY OF COLON POLYPS: Primary | ICD-10-CM

## 2024-09-27 DIAGNOSIS — K25.9 NSAID-INDUCED GASTRIC ULCER: ICD-10-CM

## 2024-09-27 DIAGNOSIS — T39.395A NSAID-INDUCED GASTRIC ULCER: ICD-10-CM

## 2024-09-27 PROCEDURE — 99215 OFFICE O/P EST HI 40 MIN: CPT | Mod: PBBFAC | Performed by: NURSE PRACTITIONER

## 2024-09-27 PROCEDURE — 99999 PR PBB SHADOW E&M-EST. PATIENT-LVL V: CPT | Mod: PBBFAC,,, | Performed by: NURSE PRACTITIONER

## 2024-09-27 RX ORDER — POLYETHYLENE GLYCOL 3350, SODIUM SULFATE ANHYDROUS, SODIUM BICARBONATE, SODIUM CHLORIDE, POTASSIUM CHLORIDE 236; 22.74; 6.74; 5.86; 2.97 G/4L; G/4L; G/4L; G/4L; G/4L
4 POWDER, FOR SOLUTION ORAL ONCE
Qty: 4000 ML | Refills: 0 | Status: SHIPPED | OUTPATIENT
Start: 2024-09-27 | End: 2024-09-27

## 2024-09-27 NOTE — PROGRESS NOTES
Clinic Consult:  [facility]ation Note    Reason for Consult:  The primary encounter diagnosis was History of colon polyps. A diagnosis of NSAID-induced gastric ulcer was also pertinent to this visit.    PCP: [facility]   [facility] [id number]    HPI:  This is a 53 y.o. male here for evaluation of colon cancer screening.   [facility].   Prior colonoscopy?: yes  Date of last colonoscopy: 2020  History of colon polyps: yes  Recall: 3 years (after path reviewed)  Family history of colon cancer: no  Abdominal pain, hematochezia, melena, nausea, vomiting, or weight loss: no    Does have a history of NSAID induced gastric ulcers with melena. He is currently taking pantoprazole 40 mg about twice a week. No issues with abdominal pain or heartburn. He does not take NSAIDs any longer.     Review of Systems   Constitutional:  Negative for fever and weight loss.   HENT:  Negative for sore throat.    Respiratory:  Negative for cough, shortness of breath and wheezing.    Cardiovascular:  Negative for chest pain and palpitations.   Gastrointestinal:  Negative for abdominal pain, blood in stool, constipation, diarrhea, heartburn, melena, nausea and vomiting.   Skin:  Negative for itching and rash.       Medical History:  has a past medical history of Hyperlipidemia and Hypertension.    Surgical History:  has a past surgical history that includes Esophagogastroduodenoscopy (N/A, 3/14/2020); Colonoscopy (N/A, 6/19/2020); and Esophagogastroduodenoscopy (N/A, 6/19/2020).    Family History: family history includes No Known Problems in his father and mother..     Social History:  reports that he has never smoked. He has never used smokeless tobacco. He reports current alcohol use. He reports that he does not use drugs.    Allergies: Reviewed    Home Medications:   Current Outpatient Medications on File Prior to Visit   Medication Sig Dispense  "Refill    amLODIPine (NORVASC) 5 MG tablet HOLD UNTIL  BLOOD PRESSURE IS ABOVE 130/80 30 tablet 0    atorvastatin calcium (LIPITOR ORAL) Take by mouth.      azelastine (ASTELIN) 137 mcg (0.1 %) nasal spray 1 spray by Nasal route 2 (two) times daily.      cetirizine (ZYRTEC) 10 MG tablet Take 10 mg by mouth once daily.      diphenhydrAMINE (BENADRYL) 25 mg capsule TAKE ONE CAPSULE BY MOUTH AT BEDTIME FOR ALLERGIES      fluticasone propionate (FLONASE) 50 mcg/actuation nasal spray 1 spray by Each Nostril route once daily.      ipratropium (ATROVENT) 21 mcg (0.03 %) nasal spray 2 sprays by Each Nostril route 2 (two) times daily. 30 mL 3    pantoprazole (PROTONIX) 40 MG tablet One po bid x 2 weeks , then one po daily for maintenance . Take 30 min before meal 44 tablet 0    citalopram (CELEXA) 20 MG tablet Celexa Take 1 time per day No date recorded tablet 1 time per day No route recorded No set duration recorded No set duration amount recorded active 20 mg      promethazine-dextromethorphan (PROMETHAZINE-DM) 6.25-15 mg/5 mL Syrp Take 5 mLs by mouth every 6 to 8 hours as needed. (Patient not taking: Reported on 11/8/2023) 180 mL 0    promethazine-dextromethorphan (PROMETHAZINE-DM) 6.25-15 mg/5 mL Syrp Take 5 mLs by mouth nightly as needed (cough). (Patient not taking: Reported on 12/11/2023) 118 mL 0    triamterene-hydrochlorothiazide 75-50 mg (MAXZIDE) 75-50 mg per tablet 1 tablet.      zolpidem (AMBIEN) 10 mg Tab TAKE 1 TABLET BY MOUTH NIGHTLY AT BEDTIME FOR 30 DAYS       No current facility-administered medications on file prior to visit.       Physical Exam:  /79 (BP Location: Right arm, Patient Position: Sitting, BP Method: Medium (Automatic))   Pulse 67   Ht 5' 7.8" (1.722 m)   Wt 100.7 kg (222 lb 0.1 oz)   BMI 33.95 kg/m²   Body mass index is 33.95 kg/m².  Physical Exam  Constitutional:       General: He is not in acute distress.  HENT:      Head: Normocephalic.   Cardiovascular:      Rate and Rhythm: " Normal rate and regular rhythm.      Heart sounds: Normal heart sounds. No murmur heard.  Pulmonary:      Effort: Pulmonary effort is normal. No respiratory distress.      Breath sounds: Normal breath sounds.   Neurological:      General: No focal deficit present.      Mental Status: He is alert.   Psychiatric:         Mood and Affect: Mood normal.         Behavior: Behavior normal.         Judgment: Judgment normal.         Labs: Pertinent labs reviewed.  CRC Screening: due     Assessment:  1. History of colon polyps    2. NSAID-induced gastric ulcer    Gastric ulcer in 3/2020 s/s NSAIDs. Had repeat EGD to confirm healing. He is still taking resolve couple of times a week.  He denies any heartburn or abdominal pain.  He no longer takes NSAIDs.    Recommendations:   - can discontinue pantoprazole  - continue to avoid NSAIDs  - due for screening colonoscopy-- high risk screening    History of colon polyps  -     Ambulatory referral/consult to Endo Procedure   -     polyethylene glycol (GOLYTELY) 236-22.74-6.74 -5.86 gram suspension; Take 4,000 mLs (4 L total) by mouth once. for 1 dose  Dispense: 4000 mL; Refill: 0    NSAID-induced gastric ulcer      Follow up if symptoms worsen or fail to improve.    Thank you so much for allowing me to participate in the care of TAMELA Khan

## 2024-09-30 ENCOUNTER — HOSPITAL ENCOUNTER (OUTPATIENT)
Dept: PREADMISSION TESTING | Facility: HOSPITAL | Age: 54
Discharge: HOME | End: 2024-09-30
Attending: INTERNAL MEDICINE
Payer: OTHER GOVERNMENT

## 2024-09-30 DIAGNOSIS — Z86.0100 HISTORY OF COLON POLYPS: Primary | ICD-10-CM

## 2024-10-15 ENCOUNTER — ANESTHESIA EVENT (OUTPATIENT)
Dept: ENDOSCOPY | Facility: HOSPITAL | Age: 54
End: 2024-10-15
Payer: OTHER GOVERNMENT

## 2024-10-15 NOTE — ANESTHESIA PREPROCEDURE EVALUATION
10/15/2024  Jelani Whaley is a 53 y.o., male.    Past Medical History:   Diagnosis Date    Hyperlipidemia     Hypertension      Past Surgical History:   Procedure Laterality Date    COLONOSCOPY N/A 6/19/2020    Procedure: COLONOSCOPY;  Surgeon: Lenka Prather MD;  Location: [facility];  Service: Endoscopy;  Laterality: N/A;    ESOPHAGOGASTRODUODENOSCOPY N/A 3/14/2020    Procedure: EGD (ESOPHAGOGASTRODUODENOSCOPY);  Surgeon: Lenka Prather MD;  Location: [facility];  Service: Endoscopy;  Laterality: N/A;    ESOPHAGOGASTRODUODENOSCOPY N/A 6/19/2020    Procedure: EGD (ESOPHAGOGASTRODUODENOSCOPY);  Surgeon: Lenka Prather MD;  Location: [facility];  Service: Endoscopy;  Laterality: N/A;     Patient Active Problem List   Diagnosis    Hypertension    Hyperlipidemia       Pre-op Assessment    I have reviewed the Patient Summary Reports.     I have reviewed the Nursing Notes. I have reviewed the NPO Status.   I have reviewed the Medications.     Review of Systems  Anesthesia Hx:  No problems with previous Anesthesia   History of prior surgery of interest to airway management or planning:          Denies Family Hx of Anesthesia complications.    Denies Personal Hx of Anesthesia complications.                    Social:  Non-Smoker, Social Alcohol Use       Hematology/Oncology:  Hematology Normal   Oncology Normal                                   EENT/Dental:  EENT/Dental Normal           Cardiovascular:     Hypertension           hyperlipidemia                               Pulmonary:  Pulmonary Normal                       Renal/:  Renal/ Normal                 Hepatic/GI:  Bowel Prep.                   Musculoskeletal:  Musculoskeletal Normal                Neurological:  Neurology Normal                                      Endocrine:        Obesity / BMI >  30  Dermatological:  Skin Normal    Psych:  Psychiatric Normal                    Physical Exam  General: Well nourished    Airway:  Mallampati: III   Mouth Opening: Normal  TM Distance: Normal  Tongue: Normal  Neck ROM: Normal ROM    Dental:  Intact        Anesthesia Plan  Type of Anesthesia, risks & benefits discussed:    Anesthesia Type: Gen Natural Airway  Intra-op Monitoring Plan: Standard ASA Monitors  Post Op Pain Control Plan: multimodal analgesia  Induction:  IV  Informed Consent: Informed consent signed with the Patient and all parties understand the risks and agree with anesthesia plan.  All questions answered.   ASA Score: 2  Day of Surgery Review of History & Physical: H&P Update referred to the surgeon/provider.    Ready For Surgery From Anesthesia Perspective.     .

## 2024-10-18 ENCOUNTER — HOSPITAL ENCOUNTER (OUTPATIENT)
Facility: HOSPITAL | Age: 54
Discharge: HOME | End: 2024-10-18
Attending: INTERNAL MEDICINE | Admitting: INTERNAL MEDICINE
Payer: OTHER GOVERNMENT

## 2024-10-18 ENCOUNTER — ANESTHESIA (OUTPATIENT)
Dept: ENDOSCOPY | Facility: HOSPITAL | Age: 54
End: 2024-10-18
Payer: OTHER GOVERNMENT

## 2024-10-18 VITALS
HEART RATE: 58 BPM | WEIGHT: 222.69 LBS | SYSTOLIC BLOOD PRESSURE: 117 MMHG | OXYGEN SATURATION: 99 % | HEIGHT: 67 IN | BODY MASS INDEX: 34.95 KG/M2 | RESPIRATION RATE: 20 BRPM | TEMPERATURE: 97 F | DIASTOLIC BLOOD PRESSURE: 79 MMHG

## 2024-10-18 DIAGNOSIS — Z86.0100 PERSONAL HISTORY OF COLON POLYPS, UNSPECIFIED: Primary | ICD-10-CM

## 2024-10-18 PROCEDURE — 45380 COLONOSCOPY AND BIOPSY: CPT | Performed by: INTERNAL MEDICINE

## 2024-10-18 PROCEDURE — 25000003 PHARM REV CODE 250: Performed by: INTERNAL MEDICINE

## 2024-10-18 PROCEDURE — 27201012 HC FORCEPS, HOT/COLD, DISP: Performed by: INTERNAL MEDICINE

## 2024-10-18 PROCEDURE — 37000008 HC ANESTHESIA 1ST 15 MINUTES: Performed by: INTERNAL MEDICINE

## 2024-10-18 PROCEDURE — 45380 COLONOSCOPY AND BIOPSY: CPT | Mod: 33,,, | Performed by: INTERNAL MEDICINE

## 2024-10-18 PROCEDURE — 63600175 PHARM REV CODE 636 W HCPCS: Performed by: INTERNAL MEDICINE

## 2024-10-18 PROCEDURE — 88305 TISSUE EXAM BY PATHOLOGIST: CPT | Mod: 59 | Performed by: PATHOLOGY

## 2024-10-18 PROCEDURE — 63600175 PHARM REV CODE 636 W HCPCS: Performed by: NURSE ANESTHETIST, CERTIFIED REGISTERED

## 2024-10-18 PROCEDURE — 37000009 HC ANESTHESIA EA ADD 15 MINS: Performed by: INTERNAL MEDICINE

## 2024-10-18 RX ORDER — PROPOFOL 10 MG/ML
VIAL (ML) INTRAVENOUS
Status: DISCONTINUED | OUTPATIENT
Start: 2024-10-18 | End: 2024-10-18

## 2024-10-18 RX ORDER — DEXTROMETHORPHAN/PSEUDOEPHED 2.5-7.5/.8
DROPS ORAL
Status: COMPLETED | OUTPATIENT
Start: 2024-10-18 | End: 2024-10-18

## 2024-10-18 RX ORDER — SODIUM CHLORIDE, SODIUM LACTATE, POTASSIUM CHLORIDE, CALCIUM CHLORIDE 600; 310; 30; 20 MG/100ML; MG/100ML; MG/100ML; MG/100ML
INJECTION, SOLUTION INTRAVENOUS CONTINUOUS
Status: DISCONTINUED | OUTPATIENT
Start: 2024-10-18 | End: 2024-10-18 | Stop reason: HOSPADM

## 2024-10-18 RX ORDER — LIDOCAINE HYDROCHLORIDE 20 MG/ML
INJECTION, SOLUTION EPIDURAL; INFILTRATION; INTRACAUDAL; PERINEURAL
Status: DISCONTINUED | OUTPATIENT
Start: 2024-10-18 | End: 2024-10-18

## 2024-10-18 RX ADMIN — PROPOFOL 100 MG: 10 INJECTION, EMULSION INTRAVENOUS at 07:10

## 2024-10-18 RX ADMIN — SODIUM CHLORIDE, POTASSIUM CHLORIDE, SODIUM LACTATE AND CALCIUM CHLORIDE: 600; 310; 30; 20 INJECTION, SOLUTION INTRAVENOUS at 07:10

## 2024-10-18 RX ADMIN — PROPOFOL 50 MG: 10 INJECTION, EMULSION INTRAVENOUS at 08:10

## 2024-10-18 RX ADMIN — PROPOFOL 20 MG: 10 INJECTION, EMULSION INTRAVENOUS at 08:10

## 2024-10-18 RX ADMIN — PROPOFOL 50 MG: 10 INJECTION, EMULSION INTRAVENOUS at 07:10

## 2024-10-18 RX ADMIN — LIDOCAINE HYDROCHLORIDE 50 MG: 20 INJECTION, SOLUTION EPIDURAL; INFILTRATION; INTRACAUDAL; PERINEURAL at 07:10

## 2024-10-18 NOTE — H&P
Short Stay Endoscopy History and Physical    PCP - [facility]    Procedure - Colonoscopy  ASA - per anesthesia  Mallampati - per anesthesia  History of Anesthesia problems - no  Family history Anesthesia problems -  no     HPI:  This is a 53 y.o. male here for evaluation of :   Active Hospital Problems    Diagnosis  POA    *Personal history of colon polyps, unspecified [Z86.0100]  Not Applicable      Resolved Hospital Problems   No resolved problems to display.         Health Maintenance         Date Due Completion Date    Hepatitis C Screening Never done ---    Lipid Panel Never done ---    Hemoglobin A1c (Diabetic Prevention Screening) Never done ---    COVID-19 Vaccine (3 - 2024-25 season) 09/01/2024 3/21/2021    Shingles Vaccine (2 of 2) 11/22/2024 9/27/2024    Colorectal Cancer Screening 06/19/2025 6/19/2020    TETANUS VACCINE 05/27/2031 5/27/2021    RSV Vaccine (Age 60+ and Pregnant patients) (1 - 1-dose 75+ series) 12/16/2044 ---              ROS:  CONSTITUTIONAL: Denies weight change,  fatigue, fevers, chills, night sweats.  CARDIOVASCULAR: Denies chest pain, shortness of breath, orthopnea and edema.  RESPIRATORY: Denies cough, hemoptysis, dyspnea, and wheezing.  GI: See HPI.    Medical History:   Past Medical History:   Diagnosis Date    Hyperlipidemia     Hypertension        Surgical History:   Past Surgical History:   Procedure Laterality Date    COLONOSCOPY N/A 6/19/2020    Procedure: COLONOSCOPY;  Surgeon: Lenka Prather MD;  Location: [facility];  Service: Endoscopy;  Laterality: N/A;    ESOPHAGOGASTRODUODENOSCOPY N/A 3/14/2020    Procedure: EGD (ESOPHAGOGASTRODUODENOSCOPY);  Surgeon: Lenka Prather MD;  Location: [facility];  Service: Endoscopy;  Laterality: N/A;    ESOPHAGOGASTRODUODENOSCOPY N/A 6/19/2020    Procedure: EGD (ESOPHAGOGASTRODUODENOSCOPY);  Surgeon: Lenka Prather MD;  Location: [facility];  Service: Endoscopy;  Laterality: N/A;       Family  History:   Family History   Problem Relation Name Age of Onset    No Known Problems Mother      No Known Problems Father         Social History:   Social History     Tobacco Use    Smoking status: Never    Smokeless tobacco: Never   Substance Use Topics    Alcohol use: Yes    Drug use: Never       Allergies:   Review of patient's allergies indicates:  No Known Allergies    Medications:   No current facility-administered medications on file prior to encounter.     Current Outpatient Medications on File Prior to Encounter   Medication Sig Dispense Refill    amLODIPine (NORVASC) 5 MG tablet HOLD UNTIL  BLOOD PRESSURE IS ABOVE 130/80 30 tablet 0    atorvastatin calcium (LIPITOR ORAL) Take by mouth.      cetirizine (ZYRTEC) 10 MG tablet Take 10 mg by mouth once daily.      azelastine (ASTELIN) 137 mcg (0.1 %) nasal spray 1 spray by Nasal route 2 (two) times daily.      diphenhydrAMINE (BENADRYL) 25 mg capsule TAKE ONE CAPSULE BY MOUTH AT BEDTIME FOR ALLERGIES      fluticasone propionate (FLONASE) 50 mcg/actuation nasal spray 1 spray by Each Nostril route once daily.      ipratropium (ATROVENT) 21 mcg (0.03 %) nasal spray 2 sprays by Each Nostril route 2 (two) times daily. 30 mL 3       Physical Exam:  Vital Signs:   Vitals:    10/18/24 0702   BP: (!) 135/93   Pulse: 66   Resp: 16   Temp: 97.5 °F (36.4 °C)     General Appearance: Well appearing in no acute distress  ENT: OP clear  Chest: CTA B  CV: RRR, no m/r/g  Abd: s/nt/nd/nabs  Ext: no edema    Labs:Reviewed    IMP:  Active Hospital Problems    Diagnosis  POA    *Personal history of colon polyps, unspecified [Z86.0100]  Not Applicable      Resolved Hospital Problems   No resolved problems to display.         Plan:   I have explained the risks and benefits of colonoscopy to the patient including but not limited to bleeding, perforation, infection, and death. The patient wishes to proceed.

## 2024-10-18 NOTE — PLAN OF CARE
Discharge instructions reviewed with patient and visitor. Handouts given & verbalized understanding with no further questions at this time. Dr. Ragsdale spoke to pt at bedside, reviewed procedure and findings, answered questions. Made aware they are awaiting biopsy results with MD telephone number provided per AVS sheet. VSS on RA, no pain or nausea noted, tolerating po fluids, no complaints noted. Fall precautions reviewed, consents in chart, PIV removed at this time.    
Pt. prepped for procedure.  
show

## 2024-10-18 NOTE — DISCHARGE SUMMARY
[facility]  Discharge Note  Short Stay    Procedure(s) (LRB):  COLONOSCOPY, SCREENING, HIGH RISK PATIENT (N/A)      OUTCOME: Patient tolerated treatment/procedure well without complication and is now ready for discharge.    DISPOSITION: Home or Self Care    FINAL DIAGNOSIS:  Personal history of colon polyps, unspecified    FOLLOWUP: With primary care provider    DISCHARGE INSTRUCTIONS:  No discharge procedures on file.

## 2024-10-18 NOTE — ANESTHESIA POSTPROCEDURE EVALUATION
Anesthesia Post Evaluation    Patient: Jelani Whaley    Procedure(s) Performed: Procedure(s) (LRB):  COLONOSCOPY, SCREENING, HIGH RISK PATIENT (N/A)    Final Anesthesia Type: general      Patient location during evaluation: PACU  Patient participation: Yes- Able to Participate  Level of consciousness: awake  Post-procedure vital signs: reviewed and stable  Pain management: adequate  Airway patency: patent    PONV status at discharge: No PONV  Anesthetic complications: no      Cardiovascular status: stable  Respiratory status: unassisted  Hydration status: euvolemic  Follow-up not needed.              Vitals Value Taken Time   /69 10/18/24 0831   Temp 36.2 °C (97.2 °F) 10/18/24 0816   Pulse 55 10/18/24 0832   Resp 21 10/18/24 0832   SpO2 97 % 10/18/24 0832   Vitals shown include unfiled device data.      No case tracking events are documented in the log.      Pain/Brian Score: Brian Score: 10 (10/18/2024  8:21 AM)

## 2024-10-18 NOTE — TRANSFER OF CARE
"Anesthesia Transfer of Care Note    Patient: Jelani Whaley    Procedure(s) Performed: Procedure(s) (LRB):  COLONOSCOPY, SCREENING, HIGH RISK PATIENT (N/A)    Patient location: PACU    Anesthesia Type: general    Transport from OR: Transported from OR on room air with adequate spontaneous ventilation    Post pain: adequate analgesia    Post assessment: no apparent anesthetic complications and tolerated procedure well    Post vital signs: stable    Level of consciousness: awake and alert    Nausea/Vomiting: no nausea/vomiting    Complications: none    Transfer of care protocol was followed      Last vitals: Visit Vitals  /78   Pulse (!) 57   Temp 36.2 °C (97.2 °F) (Temporal)   Resp 16   Ht 5' 7" (1.702 m)   Wt 101 kg (222 lb 10.6 oz)   SpO2 97%   BMI 34.87 kg/m²     "

## 2024-10-18 NOTE — [employer] PATIENT INSTRUCTIONS
Discharge Summary/Instructions after an Endoscopic Procedure  Patient Name: Jelani Diez  Patient MRN: [id number]  Patient YOB: 1970 Friday, October 18, 2024  Deepthi Ragsdale MD  Dear patient,  As a result of recent federal legislation (The Federal Cures Act), you may   receive lab or pathology results from your procedure in your [facility]   account before your physician is able to contact you. Your physician or   their representative will relay the results to you with their   recommendations at their soonest availability.  Thank you,  RESTRICTIONS:  During your procedure today, you received medications for sedation.  These   medications may affect your judgment, balance and coordination.  Therefore,   for 24 hours, you have the following restrictions:   - DO NOT drive a car, operate machinery, make legal/financial decisions,   sign important papers or drink alcohol.    ACTIVITY:  Today: no heavy lifting, straining or running due to procedural   sedation/anesthesia.  The following day: return to full activity including work.  DIET:  Eat and drink normally unless instructed otherwise.     TREATMENT FOR COMMON SIDE EFFECTS:  - Mild abdominal pain, nausea, belching, bloating or excessive gas:  rest,   eat lightly and use a heating pad.  - Sore Throat: treat with throat lozenges and/or gargle with warm salt   water.  - Because air was used during the procedure, expelling large amounts of air   from your rectum or belching is normal.  - If a bowel prep was taken, you may not have a bowel movement for 1-3 days.    This is normal.  SYMPTOMS TO WATCH FOR AND REPORT TO YOUR PHYSICIAN:  1. Abdominal pain or bloating, other than gas cramps.  2. Chest pain.  3. Back pain.  4. Signs of infection such as: chills or fever occurring within 24 hours   after the procedure.  5. Rectal bleeding, which would show as bright red, maroon, or black stools.   (A tablespoon of blood from the rectum is not serious,  especially if   hemorrhoids are present.)  6. Vomiting.  7. Weakness or dizziness.  GO DIRECTLY TO THE NEAREST EMERGENCY ROOM IF YOU HAVE ANY OF THE FOLLOWING:      Difficulty breathing              Chills and/or fever over 101 F   Persistent vomiting and/or vomiting blood   Severe abdominal pain   Severe chest pain   Black, tarry stools   Bleeding- more than one tablespoon   Any other symptom or condition that you feel may need urgent attention  Your doctor recommends these additional instructions:  If any biopsies were taken, your doctors clinic will contact you in 1 to 2   weeks with any results.  - Discharge patient to home (via wheelchair).   - Resume previous diet.   - Continue present medications.   - Await pathology results.   - Repeat colonoscopy in 3 years for surveillance.   - Patient has a contact number available for emergencies.  The signs and   symptoms of potential delayed complications were discussed with the   patient.  Return to normal activities tomorrow.  Written discharge   instructions were provided to the patient.  For questions, problems or results please call your physician Deepthi Ragsdale MD at Work:  [phone]  If you have any questions about the above instructions, call the GI   department at [phone] or call the endoscopy unit at [phone]   from 7am until 3 pm.  [facility] [place], EMERGENCY ROOM PHONE NUMBER:   [phone]  IF A COMPLICATION OR EMERGENCY SITUATION ARISES AND YOU ARE UNABLE TO REACH   YOUR PHYSICIAN - GO DIRECTLY TO THE EMERGENCY ROOM.  I have read or have had read to me these discharge instructions for my   procedure and have received a written copy.  I understand these   instructions and will follow-up with my physician if I have any questions.     __________________________________       _____________________________________  Nurse Signature                                          Patient/Designated   Responsible Party  Signature  MD Deepthi Patel MD  10/18/2024 8:15:37 AM  [employer]

## 2024-10-21 LAB
FINAL PATHOLOGIC DIAGNOSIS: NORMAL
GROSS: NORMAL
Lab: NORMAL